# Patient Record
Sex: FEMALE | Race: WHITE | NOT HISPANIC OR LATINO | Employment: FULL TIME | URBAN - NONMETROPOLITAN AREA
[De-identification: names, ages, dates, MRNs, and addresses within clinical notes are randomized per-mention and may not be internally consistent; named-entity substitution may affect disease eponyms.]

---

## 2020-08-05 ENCOUNTER — LAB (OUTPATIENT)
Dept: LAB | Facility: HOSPITAL | Age: 30
End: 2020-08-05

## 2020-08-05 ENCOUNTER — OFFICE VISIT (OUTPATIENT)
Dept: FAMILY MEDICINE CLINIC | Facility: CLINIC | Age: 30
End: 2020-08-05

## 2020-08-05 VITALS
HEIGHT: 61 IN | DIASTOLIC BLOOD PRESSURE: 82 MMHG | TEMPERATURE: 98.8 F | BODY MASS INDEX: 27.38 KG/M2 | SYSTOLIC BLOOD PRESSURE: 124 MMHG | WEIGHT: 145 LBS

## 2020-08-05 DIAGNOSIS — R41.840 POOR CONCENTRATION: ICD-10-CM

## 2020-08-05 DIAGNOSIS — R53.83 MALAISE AND FATIGUE: ICD-10-CM

## 2020-08-05 DIAGNOSIS — Z00.00 GENERAL MEDICAL EXAM: Primary | ICD-10-CM

## 2020-08-05 DIAGNOSIS — R53.81 MALAISE AND FATIGUE: ICD-10-CM

## 2020-08-05 LAB
25(OH)D3 SERPL-MCNC: 30.6 NG/ML (ref 30–100)
ALBUMIN SERPL-MCNC: 4.3 G/DL (ref 3.5–5.2)
ALBUMIN/GLOB SERPL: 1.5 G/DL
ALP SERPL-CCNC: 60 U/L (ref 39–117)
ALT SERPL W P-5'-P-CCNC: 17 U/L (ref 1–33)
ANION GAP SERPL CALCULATED.3IONS-SCNC: 8.1 MMOL/L (ref 5–15)
AST SERPL-CCNC: 10 U/L (ref 1–32)
BASOPHILS # BLD AUTO: 0.04 10*3/MM3 (ref 0–0.2)
BASOPHILS NFR BLD AUTO: 0.5 % (ref 0–1.5)
BILIRUB SERPL-MCNC: 0.4 MG/DL (ref 0–1.2)
BUN SERPL-MCNC: 10 MG/DL (ref 6–20)
BUN/CREAT SERPL: 12 (ref 7–25)
CALCIUM SPEC-SCNC: 9.3 MG/DL (ref 8.6–10.5)
CHLORIDE SERPL-SCNC: 105 MMOL/L (ref 98–107)
CHOLEST SERPL-MCNC: 181 MG/DL (ref 0–200)
CO2 SERPL-SCNC: 22.9 MMOL/L (ref 22–29)
CREAT SERPL-MCNC: 0.83 MG/DL (ref 0.57–1)
DEPRECATED RDW RBC AUTO: 40.6 FL (ref 37–54)
EOSINOPHIL # BLD AUTO: 0.03 10*3/MM3 (ref 0–0.4)
EOSINOPHIL NFR BLD AUTO: 0.4 % (ref 0.3–6.2)
ERYTHROCYTE [DISTWIDTH] IN BLOOD BY AUTOMATED COUNT: 13 % (ref 12.3–15.4)
GFR SERPL CREATININE-BSD FRML MDRD: 80 ML/MIN/1.73
GLOBULIN UR ELPH-MCNC: 2.9 GM/DL
GLUCOSE SERPL-MCNC: 86 MG/DL (ref 65–99)
HCT VFR BLD AUTO: 41 % (ref 34–46.6)
HDLC SERPL-MCNC: 47 MG/DL (ref 40–60)
HGB BLD-MCNC: 14.1 G/DL (ref 12–15.9)
IMM GRANULOCYTES # BLD AUTO: 0.03 10*3/MM3 (ref 0–0.05)
IMM GRANULOCYTES NFR BLD AUTO: 0.4 % (ref 0–0.5)
IRON 24H UR-MRATE: 88 MCG/DL (ref 37–145)
LDLC SERPL CALC-MCNC: 122 MG/DL (ref 0–100)
LDLC/HDLC SERPL: 2.59 {RATIO}
LYMPHOCYTES # BLD AUTO: 2.79 10*3/MM3 (ref 0.7–3.1)
LYMPHOCYTES NFR BLD AUTO: 34 % (ref 19.6–45.3)
MAGNESIUM SERPL-MCNC: 2.2 MG/DL (ref 1.6–2.6)
MCH RBC QN AUTO: 29.9 PG (ref 26.6–33)
MCHC RBC AUTO-ENTMCNC: 34.4 G/DL (ref 31.5–35.7)
MCV RBC AUTO: 86.9 FL (ref 79–97)
MONOCYTES # BLD AUTO: 0.42 10*3/MM3 (ref 0.1–0.9)
MONOCYTES NFR BLD AUTO: 5.1 % (ref 5–12)
NEUTROPHILS NFR BLD AUTO: 4.9 10*3/MM3 (ref 1.7–7)
NEUTROPHILS NFR BLD AUTO: 59.6 % (ref 42.7–76)
NRBC BLD AUTO-RTO: 0 /100 WBC (ref 0–0.2)
PLATELET # BLD AUTO: 141 10*3/MM3 (ref 140–450)
PMV BLD AUTO: 12 FL (ref 6–12)
POTASSIUM SERPL-SCNC: 4.4 MMOL/L (ref 3.5–5.2)
PROT SERPL-MCNC: 7.2 G/DL (ref 6–8.5)
RBC # BLD AUTO: 4.72 10*6/MM3 (ref 3.77–5.28)
SODIUM SERPL-SCNC: 136 MMOL/L (ref 136–145)
T3 SERPL-MCNC: 120 NG/DL (ref 80–200)
T4 FREE SERPL-MCNC: 1.08 NG/DL (ref 0.93–1.7)
TRIGL SERPL-MCNC: 62 MG/DL (ref 0–150)
TSH SERPL DL<=0.05 MIU/L-ACNC: 0.72 UIU/ML (ref 0.27–4.2)
VIT B12 BLD-MCNC: 671 PG/ML (ref 211–946)
VLDLC SERPL-MCNC: 12.4 MG/DL (ref 5–40)
WBC # BLD AUTO: 8.21 10*3/MM3 (ref 3.4–10.8)

## 2020-08-05 PROCEDURE — 80061 LIPID PANEL: CPT | Performed by: NURSE PRACTITIONER

## 2020-08-05 PROCEDURE — 84443 ASSAY THYROID STIM HORMONE: CPT | Performed by: NURSE PRACTITIONER

## 2020-08-05 PROCEDURE — 36415 COLL VENOUS BLD VENIPUNCTURE: CPT | Performed by: NURSE PRACTITIONER

## 2020-08-05 PROCEDURE — 85025 COMPLETE CBC W/AUTO DIFF WBC: CPT | Performed by: NURSE PRACTITIONER

## 2020-08-05 PROCEDURE — 82306 VITAMIN D 25 HYDROXY: CPT | Performed by: NURSE PRACTITIONER

## 2020-08-05 PROCEDURE — 80053 COMPREHEN METABOLIC PANEL: CPT | Performed by: NURSE PRACTITIONER

## 2020-08-05 PROCEDURE — 83735 ASSAY OF MAGNESIUM: CPT | Performed by: NURSE PRACTITIONER

## 2020-08-05 PROCEDURE — 83540 ASSAY OF IRON: CPT | Performed by: NURSE PRACTITIONER

## 2020-08-05 PROCEDURE — 99203 OFFICE O/P NEW LOW 30 MIN: CPT | Performed by: NURSE PRACTITIONER

## 2020-08-05 PROCEDURE — 84480 ASSAY TRIIODOTHYRONINE (T3): CPT | Performed by: NURSE PRACTITIONER

## 2020-08-05 PROCEDURE — 84439 ASSAY OF FREE THYROXINE: CPT | Performed by: NURSE PRACTITIONER

## 2020-08-05 PROCEDURE — 82607 VITAMIN B-12: CPT | Performed by: NURSE PRACTITIONER

## 2020-08-05 RX ORDER — CETIRIZINE HYDROCHLORIDE 10 MG/1
10 TABLET ORAL DAILY
COMMUNITY

## 2020-08-05 RX ORDER — BUPROPION HYDROCHLORIDE 300 MG/1
300 TABLET ORAL DAILY
COMMUNITY
Start: 2019-11-13

## 2020-08-05 NOTE — PROGRESS NOTES
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     Presents with concern for concentration and malaise-history of same but getting worse-takes wellbutrin for anxiety and it helps -general exam and to est care-requesting labs     Fatigue   This is a recurrent problem. The current episode started more than 1 month ago. The problem occurs intermittently. The problem has been gradually worsening. Associated symptoms include fatigue. Pertinent negatives include no abdominal pain, anorexia, arthralgias, change in bowel habit, chest pain, chills, congestion, coughing, diaphoresis, fever, headaches, joint swelling, myalgias, nausea, neck pain, numbness, rash, sore throat, swollen glands, urinary symptoms, vertigo, visual change, vomiting or weakness. She has tried rest for the symptoms. The treatment provided mild relief.        The following portions of the patient's history were reviewed and updated as appropriate: allergies, current medications, past social history and problem list.    Review of Systems   Constitutional: Positive for fatigue. Negative for activity change, appetite change, chills, diaphoresis, fever and unexpected weight change.   HENT: Negative.  Negative for congestion, dental problem, drooling, ear discharge, ear pain, facial swelling, hearing loss, mouth sores, nosebleeds, postnasal drip, rhinorrhea, sinus pressure, sinus pain, sneezing and sore throat.    Eyes: Negative.  Negative for photophobia, pain, discharge, redness, itching and visual disturbance.   Respiratory: Negative.  Negative for apnea, cough, choking, chest tightness, shortness of breath, wheezing and stridor.    Cardiovascular: Negative.  Negative for chest pain, palpitations and leg swelling.   Gastrointestinal: Negative.  Negative for abdominal distention, abdominal pain, anal bleeding, anorexia, blood in stool, change in bowel habit,  "constipation, diarrhea, nausea, rectal pain and vomiting.   Endocrine: Negative.  Negative for cold intolerance, heat intolerance, polydipsia, polyphagia and polyuria.   Genitourinary: Negative.    Musculoskeletal: Negative.  Negative for arthralgias, back pain, gait problem, joint swelling, myalgias, neck pain and neck stiffness.   Skin: Negative.  Negative for color change, pallor and rash.   Allergic/Immunologic: Negative.  Negative for environmental allergies, food allergies and immunocompromised state.   Neurological: Negative.  Negative for dizziness, vertigo, tremors, seizures, syncope, facial asymmetry, speech difficulty, weakness, light-headedness, numbness and headaches.   Hematological: Negative.  Negative for adenopathy. Does not bruise/bleed easily.   Psychiatric/Behavioral: Positive for decreased concentration and sleep disturbance. Negative for agitation, behavioral problems, confusion, dysphoric mood, hallucinations, self-injury and suicidal ideas. The patient is nervous/anxious. The patient is not hyperactive.         Patient has taken lexapro, wellbutrin       Concentration concerns        Objective   /82   Temp 98.8 °F (37.1 °C) (Tympanic)   Ht 154.9 cm (61\")   Wt 65.8 kg (145 lb)   BMI 27.40 kg/m²   Physical Exam   Constitutional: She is oriented to person, place, and time. She appears well-developed and well-nourished.   HENT:   Head: Normocephalic and atraumatic.   Right Ear: External ear normal.   Left Ear: External ear normal.   Mouth/Throat: Oropharynx is clear and moist. No oropharyngeal exudate.   Eyes: Pupils are equal, round, and reactive to light. EOM are normal. Right eye exhibits no discharge. Left eye exhibits no discharge. No scleral icterus.   Neck: Normal range of motion. Neck supple. No JVD present. No tracheal deviation present. No thyromegaly present.   Cardiovascular: Normal rate, regular rhythm and normal heart sounds. Exam reveals no gallop and no friction rub. "   No murmur heard.  Pulmonary/Chest: Effort normal and breath sounds normal. No stridor. No respiratory distress. She has no wheezes. She has no rales. She exhibits no tenderness.   Abdominal: Soft. Bowel sounds are normal. She exhibits no distension and no mass. There is no tenderness. There is no rebound and no guarding. No hernia.   Musculoskeletal: Normal range of motion. She exhibits no edema, tenderness or deformity.   Lymphadenopathy:     She has no cervical adenopathy.   Neurological: She is alert and oriented to person, place, and time. She displays normal reflexes. No cranial nerve deficit or sensory deficit. She exhibits normal muscle tone. Coordination normal.   Skin: No rash noted. No erythema. No pallor.   Nursing note and vitals reviewed.      Assessment/Plan   Problem List Items Addressed This Visit        Other    General medical exam - Primary    Relevant Orders    CBC & Differential    Comprehensive Metabolic Panel    TSH    Vitamin B12    Vitamin D 25 Hydroxy    Magnesium    T3    T4, Free    Iron    Ambulatory Referral to Behavioral Health (Completed)    Lipid Panel    CBC Auto Differential    Malaise and fatigue    Relevant Orders    CBC & Differential    Comprehensive Metabolic Panel    TSH    Vitamin B12    Vitamin D 25 Hydroxy    Magnesium    T3    T4, Free    Iron    Ambulatory Referral to Behavioral Health (Completed)    Lipid Panel    CBC Auto Differential    Poor concentration    Relevant Orders    Ambulatory Referral to Behavioral Health (Completed)         No orders of the defined types were placed in this encounter.      It's not just what you eat, but when you eat  Eat breakfast, and eat smaller meals throughout the day. A healthy breakfast can jumpstart your metabolism, while eating small, healthy meals (rather than the standard three large meals) keeps your energy up.   Avoid eating at night. Try to eat dinner earlier and fast for 14-16 hours until breakfast the next morning.  Studies suggest that eating only when you’re most active and giving your digestive system a long break each day may help to regulate weight.     Continue wellbutrin refer for mental health evaluation for concentration concerns patient agrees

## 2020-10-14 ENCOUNTER — APPOINTMENT (OUTPATIENT)
Dept: GENERAL RADIOLOGY | Facility: HOSPITAL | Age: 30
End: 2020-10-14

## 2020-10-14 ENCOUNTER — HOSPITAL ENCOUNTER (EMERGENCY)
Facility: HOSPITAL | Age: 30
Discharge: HOME | End: 2020-10-14
Attending: EMERGENCY MEDICINE | Admitting: EMERGENCY MEDICINE

## 2020-10-14 VITALS
HEIGHT: 61 IN | HEART RATE: 101 BPM | SYSTOLIC BLOOD PRESSURE: 138 MMHG | DIASTOLIC BLOOD PRESSURE: 81 MMHG | BODY MASS INDEX: 27.66 KG/M2 | RESPIRATION RATE: 20 BRPM | WEIGHT: 146.5 LBS | TEMPERATURE: 97.9 F | OXYGEN SATURATION: 97 %

## 2020-10-14 DIAGNOSIS — S93.601A SPRAIN OF RIGHT FOOT, INITIAL ENCOUNTER: Primary | ICD-10-CM

## 2020-10-14 PROCEDURE — 73630 X-RAY EXAM OF FOOT: CPT

## 2020-10-14 PROCEDURE — 99283 EMERGENCY DEPT VISIT LOW MDM: CPT

## 2020-10-14 RX ORDER — NAPROXEN 500 MG/1
500 TABLET ORAL 2 TIMES DAILY PRN
Qty: 10 TABLET | Refills: 0 | Status: SHIPPED | OUTPATIENT
Start: 2020-10-14 | End: 2020-10-19 | Stop reason: SDUPTHER

## 2020-10-14 RX ORDER — NAPROXEN 500 MG/1
500 TABLET ORAL ONCE
Status: COMPLETED | OUTPATIENT
Start: 2020-10-14 | End: 2020-10-14

## 2020-10-14 RX ADMIN — NAPROXEN 500 MG: 500 TABLET ORAL at 23:24

## 2020-10-15 NOTE — ED NOTES
Walking boot applied to right foot, pt educated on care of boot; pt is educated in crutch walking and returns safe demonstration.      Erasmo Prakash, RN  10/14/20 [id number]

## 2020-10-15 NOTE — ED PROVIDER NOTES
Subjective   30-year-old white female presents to the emergency department with chief complaint of foot injury.  Patient relates she was running in the gym and fell injuring her right foot at 4:45 PM.  She relates the pain is 9 out of 10 severity.  She relates the pain is worse with movement and walking.  She relates she is feeling well otherwise.  She relates she is not pregnant.          Review of Systems   Constitutional: Negative for fever.   Respiratory: Negative for cough and shortness of breath.    Cardiovascular: Negative for chest pain.   Gastrointestinal: Negative for abdominal pain, nausea and vomiting.   Genitourinary: Negative for dysuria.   Musculoskeletal: Positive for arthralgias. Negative for back pain and neck pain.   Neurological: Negative for syncope, weakness, numbness and headaches.   All other systems reviewed and are negative.      Past Medical History:   Diagnosis Date   • Encounter for  visit    • Pneumonia, histoplasma (CMS/HCC)     on sporonox      • Solitary lung nodule     RIGHT lower lobe      • Supervision of normal first pregnancy        Allergies   Allergen Reactions   • Ceclor [Cefaclor] Rash       Past Surgical History:   Procedure Laterality Date   • LAPAROSCOPIC APPENDECTOMY  2012    Acute appendicitis   • PAP SMEAR  2010    negative   • WISDOM TOOTH EXTRACTION         Family History   Problem Relation Age of Onset   • No Known Problems Mother    • No Known Problems Father    • Colon cancer Maternal Aunt    • Breast cancer Paternal Aunt    • Breast cancer Other    • Colon cancer Other    • Diabetes Other    • Heart disease Other    • Hypertension Other    • Stroke Other    • Gallbladder disease Other         stones       Social History     Socioeconomic History   • Marital status:      Spouse name: Not on file   • Number of children: Not on file   • Years of education: Not on file   • Highest education level: Not on file   Tobacco Use   • Smoking  status: Never Smoker   • Tobacco comment: No exposure to environmental tobacco smoke   Substance and Sexual Activity   • Alcohol use: No           Objective   Physical Exam  Vitals signs and nursing note reviewed.   Constitutional:       General: She is not in acute distress.     Appearance: She is not diaphoretic.   HENT:      Head: Normocephalic and atraumatic.      Right Ear: External ear normal.      Left Ear: External ear normal.      Nose: Nose normal.      Mouth/Throat:      Mouth: Mucous membranes are moist.      Pharynx: Oropharynx is clear.   Eyes:      Extraocular Movements: Extraocular movements intact.      Conjunctiva/sclera: Conjunctivae normal.      Pupils: Pupils are equal, round, and reactive to light.   Neck:      Musculoskeletal: Normal range of motion and neck supple.   Cardiovascular:      Rate and Rhythm: Normal rate and regular rhythm.      Pulses: Normal pulses.      Heart sounds: Normal heart sounds.   Pulmonary:      Effort: Pulmonary effort is normal.      Breath sounds: Normal breath sounds.   Abdominal:      Palpations: Abdomen is soft.      Tenderness: There is no abdominal tenderness.   Musculoskeletal:      Comments: Right foot tenderness on the dorsal and plantar aspect.  No deformity.  Neurovascular intact distally.   Skin:     General: Skin is warm and dry.   Neurological:      Mental Status: She is alert.      Sensory: No sensory deficit.   Psychiatric:         Mood and Affect: Mood normal.         Behavior: Behavior normal.         Procedures           ED Course      Labs Reviewed - No data to display  Xr Foot 3+ View Right    Result Date: 10/14/2020  Narrative: EXAM DESCRIPTION: XR FOOT 3+ VW RIGHT CLINICAL HISTORY: injury COMPARISON: None TECHNIQUE: Three views of the right foot. FINDINGS: There is no acute fracture or dislocation. Bone mineralization is within normal limits. Joint spaces are preserved. Soft tissues are unremarkable. There is no radiopaque foreign body  material.     Impression: No acute osseous abnormality Electronically signed by:  Daniel Payton MD  10/14/2020 9:31 PM CDT Workstation: [id number]                                       [facility]    Final diagnoses:   Sprain of right foot, initial encounter            Morales Sawant MD  10/14/20 [id number]

## 2020-10-19 ENCOUNTER — OFFICE VISIT (OUTPATIENT)
Dept: PODIATRY | Facility: CLINIC | Age: 30
End: 2020-10-19

## 2020-10-19 VITALS — BODY MASS INDEX: 27.66 KG/M2 | OXYGEN SATURATION: 99 % | WEIGHT: 146.5 LBS | HEIGHT: 61 IN | HEART RATE: 127 BPM

## 2020-10-19 DIAGNOSIS — M79.671 RIGHT FOOT PAIN: ICD-10-CM

## 2020-10-19 DIAGNOSIS — S93.621A LISFRANC'S SPRAIN, RIGHT, INITIAL ENCOUNTER: Primary | ICD-10-CM

## 2020-10-19 PROCEDURE — 99203 OFFICE O/P NEW LOW 30 MIN: CPT | Performed by: PODIATRIST

## 2020-10-19 RX ORDER — NAPROXEN 500 MG/1
500 TABLET ORAL 2 TIMES DAILY PRN
Qty: 60 TABLET | Refills: 0 | Status: SHIPPED | OUTPATIENT
Start: 2020-10-19 | End: 2020-10-19 | Stop reason: SDUPTHER

## 2020-10-19 RX ORDER — FLUOXETINE HYDROCHLORIDE 20 MG/1
40 CAPSULE ORAL DAILY
COMMUNITY
Start: 2020-10-16

## 2020-10-19 RX ORDER — NAPROXEN 500 MG/1
500 TABLET ORAL 2 TIMES DAILY PRN
Qty: 60 TABLET | Refills: 0 | Status: SHIPPED | OUTPATIENT
Start: 2020-10-19 | End: 2020-11-16

## 2020-10-19 NOTE — PROGRESS NOTES
Rosey Aguilar  1990  30 y.o. female     Patient presents to clinic today with complaint of right foot pain. Patient is here for a follow up after going to the ER with foot pain.    10/19/2020  Chief Complaint   Patient presents with   • Right Foot - Pain           History of Present Illness    Rosey Aguilar is a 30 y.o. female who presents on follow-up from the emergency department for evaluation of right foot injury.  Injury occurred approximately 1 week prior.  Patient states she was working out with a friend and racing when she jumped and kicked off a wall.  She states when she landed she landed with her foot fully plantarflexed and landed on the ball of her foot.  She did have immediate sharp pain that was not too severe however she then experienced increased swelling and tightness in her shoe.  By later that evening she states she was unable to ambulate.  She denies any other trauma or injuries.  She was seen in the emergency department who took x-rays which were negative for fracture.  She was placed into a cam boot and states that her foot does feel better while in the cam boot, however she still having pain on top and bottom of her midfoot.  She has also taken naproxen which helps somewhat with her pain.      Past Medical History:   Diagnosis Date   • Encounter for  visit    • Pneumonia, histoplasma (CMS/HCC)     on sporonox      • Solitary lung nodule     RIGHT lower lobe      • Supervision of normal first pregnancy          Past Surgical History:   Procedure Laterality Date   •  SECTION     • LAPAROSCOPIC APPENDECTOMY  2012    Acute appendicitis   • PAP SMEAR  2010    negative   • WISDOM TOOTH EXTRACTION           Family History   Problem Relation Age of Onset   • No Known Problems Mother    • No Known Problems Father    • Colon cancer Maternal Aunt    • Breast cancer Paternal Aunt    • Breast cancer Other    • Colon cancer Other    • Diabetes Other    • Heart  "disease Other    • Hypertension Other    • Stroke Other    • Gallbladder disease Other         stones         Social History     Socioeconomic History   • Marital status:      Spouse name: Not on file   • Number of children: Not on file   • Years of education: Not on file   • Highest education level: Not on file   Tobacco Use   • Smoking status: Never Smoker   • Tobacco comment: No exposure to environmental tobacco smoke   Substance and Sexual Activity   • Alcohol use: No         Current Outpatient Medications   Medication Sig Dispense Refill   • buPROPion XL (WELLBUTRIN XL) 300 MG 24 hr tablet Take 300 mg by mouth Daily.     • cetirizine (ZyrTEC Allergy) 10 MG tablet      • FLUoxetine (PROzac) 20 MG capsule      • naproxen (NAPROSYN) 500 MG tablet Take 1 tablet by mouth 2 (Two) Times a Day As Needed for Moderate Pain . 60 tablet 0     No current facility-administered medications for this visit.          OBJECTIVE    Pulse (!) 127   Ht 154.9 cm (61\")   Wt 66.5 kg (146 lb 8 oz)   SpO2 99%   BMI 27.68 kg/m²       Review of Systems   Constitutional: Positive for activity change.   Musculoskeletal:        Foot pain   Psychiatric/Behavioral: The patient is nervous/anxious.          Physical Exam   Constitutional: she appears well-developed and well-nourished.   HEENT: Normocephalic. Atraumatic.  CV: No CP. RRR  Resp: Non-labored respirations.  Psychiatric: she has a normal mood and affect. her behavior is normal.         Lower Extremity Exam:  Vascular: DP/PT pulses palpable 2+.   Mild edema, right foot  Foot warm  CFT wnl  Neuro: Protective sensation intact, b/l.  DTRs intact  Negative Tinel  Integument: No open wounds or lesions.  No erythema, scaling  No masses  Musculoskeletal: LE muscle strength 5/5.   Gait normal  Ankle ROM decreased without pain or crepitus  STJ ROM full without pain or crepitus  Severe tenderness palpation of first intermetatarsal space, tarsometatarsal joint.  No obvious crepitus on " range of motion          ASSESSMENT AND PLAN    Diagnoses and all orders for this visit:    1. Lisfranc's sprain, right, initial encounter (Primary)    2. Right foot pain      -Comprehensive foot and ankle exam  -Radiographs reviewed  -History of injury and physical exam concerning for ligamentous Lisfranc injury.  Did recommend MRI to rule out ligamentous rupture.  -Continue cam boot for all weightbearing  -Refill naproxen 500 mg twice daily  -Recheck following MRI          This document has been electronically signed by Fabrice Langley DPM on October 19, 2020 14:45 CDT     EMR Dragon/Transcription disclaimer:   Much of this encounter note is an electronic transcription/translation of spoken language to printed text. The electronic translation of spoken language may permit erroneous, or at times, nonsensical words or phrases to be inadvertently transcribed; Although I have reviewed the note for such errors, some may still exist.    Fabrice Langley DPM  10/19/2020  14:45 CDT

## 2020-10-27 ENCOUNTER — HOSPITAL ENCOUNTER (OUTPATIENT)
Dept: MRI IMAGING | Facility: HOSPITAL | Age: 30
Discharge: HOME | End: 2020-10-27
Admitting: PODIATRIST

## 2020-10-27 DIAGNOSIS — S93.621A LISFRANC'S SPRAIN, RIGHT, INITIAL ENCOUNTER: ICD-10-CM

## 2020-10-27 PROCEDURE — 73718 MRI LOWER EXTREMITY W/O DYE: CPT

## 2020-11-02 ENCOUNTER — OFFICE VISIT (OUTPATIENT)
Dept: PODIATRY | Facility: CLINIC | Age: 30
End: 2020-11-02

## 2020-11-02 VITALS — BODY MASS INDEX: 26.81 KG/M2 | HEART RATE: 120 BPM | HEIGHT: 61 IN | OXYGEN SATURATION: 98 % | WEIGHT: 142 LBS

## 2020-11-02 DIAGNOSIS — S92.254D CLOSED NONDISPLACED FRACTURE OF NAVICULAR BONE OF RIGHT FOOT WITH ROUTINE HEALING, SUBSEQUENT ENCOUNTER: Primary | ICD-10-CM

## 2020-11-02 DIAGNOSIS — S93.621D LISFRANC'S SPRAIN, RIGHT, SUBSEQUENT ENCOUNTER: ICD-10-CM

## 2020-11-02 DIAGNOSIS — M79.671 RIGHT FOOT PAIN: ICD-10-CM

## 2020-11-02 PROCEDURE — 99213 OFFICE O/P EST LOW 20 MIN: CPT | Performed by: PODIATRIST

## 2020-11-02 NOTE — PROGRESS NOTES
Rosey Aguilar  1990  30 y.o. female     Patient presents to clinic today with for a follow up on right foot pain and MRI results.    2020    Chief Complaint   Patient presents with   • Right Foot - Follow-up, Pain           History of Present Illness    Rosey Aguilar is a 30 y.o. female who presents on follow-up of right midfoot injury.  Injury occurred while working out approximately 3 weeks prior.  Had MRI since last visit as there was some concern for ligamentous Lisfranc injury.  She presents today in cam boot and modified weightbearing with crutches.  Still having some pain, mildly improved.    Past Medical History:   Diagnosis Date   • Encounter for  visit    • Pneumonia, histoplasma (CMS/HCC)     on sporonox      • Solitary lung nodule     RIGHT lower lobe      • Supervision of normal first pregnancy          Past Surgical History:   Procedure Laterality Date   •  SECTION     • LAPAROSCOPIC APPENDECTOMY  2012    Acute appendicitis   • PAP SMEAR  2010    negative   • WISDOM TOOTH EXTRACTION           Family History   Problem Relation Age of Onset   • No Known Problems Mother    • No Known Problems Father    • Colon cancer Maternal Aunt    • Breast cancer Paternal Aunt    • Breast cancer Other    • Colon cancer Other    • Diabetes Other    • Heart disease Other    • Hypertension Other    • Stroke Other    • Gallbladder disease Other         stones         Social History     Socioeconomic History   • Marital status:      Spouse name: Not on file   • Number of children: Not on file   • Years of education: Not on file   • Highest education level: Not on file   Tobacco Use   • Smoking status: Never Smoker   • Tobacco comment: No exposure to environmental tobacco smoke   Substance and Sexual Activity   • Alcohol use: No         Current Outpatient Medications   Medication Sig Dispense Refill   • buPROPion XL (WELLBUTRIN XL) 300 MG 24 hr tablet Take 300 mg by mouth  "Daily.     • cetirizine (ZyrTEC Allergy) 10 MG tablet      • FLUoxetine (PROzac) 20 MG capsule      • naproxen (NAPROSYN) 500 MG tablet Take 1 tablet by mouth 2 (Two) Times a Day As Needed for Moderate Pain . 60 tablet 0     No current facility-administered medications for this visit.          OBJECTIVE    Pulse 120   Ht 154.9 cm (61\")   Wt 64.4 kg (142 lb)   SpO2 98%   BMI 26.83 kg/m²       Review of Systems   Constitutional: Positive for activity change.   Musculoskeletal:        Foot pain   Psychiatric/Behavioral: The patient is nervous/anxious.          Physical Exam   Constitutional: she appears well-developed and well-nourished.   HEENT: Normocephalic. Atraumatic.  CV: No CP. RRR  Resp: Non-labored respirations.  Psychiatric: she has a normal mood and affect. her behavior is normal.         Lower Extremity Exam:  Vascular: DP/PT pulses palpable 2+.   Minimal edema, right foot  Foot warm  CFT wnl  Neuro: Protective sensation intact, b/l.  DTRs intact  Negative Tinel  Integument: No open wounds or lesions.  No erythema, scaling  No masses  Musculoskeletal: LE muscle strength 5/5.   Gait normal  Ankle ROM decreased without pain or crepitus  STJ ROM full without pain or crepitus  Moderate tenderness palpation of dorsal midfoot tarsometatarsal and naviculocuneiform joints.  No gross instability or crepitus.    EXAM: MR FOOT WITHOUT IV CONTRAST, RIGHT     COMPARISONS: Radiograph 10/14/2020     INDICATION: Foot trauma, Lisfranc suspected, xray done (Age >=  6y), S93.621A Sprain of tarsometatarsal ligament of right foot,  initial encounter     TECHNIQUE: Multiplanar, multisequence MR images were obtained of  the right  foot without the use of intravenous contrast.     FINDINGS:  Edema is seen throughout the navicular bone with thin curvilinear  hypointense nondisplaced fracture line at the more distal and  plantar aspect.     No dislocation. Joint spaces are preserved.      There is no widening of the Lisfranc " interval. Lisfranc ligaments  appear intact. No significant edema is seen within the Lisfranc  interval.     Small focus of faint subchondral cystic changes at the medial and  distal aspect of the medial cuneiform.     Talar dome is intact. Syndesmosis is of normal width.     Medial and lateral ankle tendons are grossly intact.     Flexor and extensor tendons of the right foot are grossly  unremarkable.     Medial and lateral ankle ligaments are grossly intact.     Sinus tarsi is within normal limits. Spring ligament is intact.     Plantar fascia is unremarkable. Achilles tendon is normal.     Musculature is grossly normal in bulk and signal.     Superficial subcutaneous soft tissues are unremarkable.     IMPRESSION:  Nondisplaced fracture of the right navicular bone.     No Lisfranc abnormality as clinically queried.     Minimal degenerative changes at the first tarsometatarsal joint.     Electronically signed by:  Sandy Mack MD  10/27/2020  2:16 PM CDT Workstation: [id number]      ASSESSMENT AND PLAN    Diagnoses and all orders for this visit:    1. Closed nondisplaced fracture of navicular bone of right foot with routine healing, subsequent encounter (Primary)    2. Lisfranc's sprain, right, subsequent encounter    3. Right foot pain      -Comprehensive foot and ankle exam  -MRI reviewed with patient.  No obvious ligamentous tear at the Lisfranc apparatus however patient does have significant bone bruising and probable small plantar avulsion fracture of the navicular.  -We will transition her into a tall boot continue modified weightbearing with crutches, may heel weight-bear as tolerated around her house.  -NSAIDs as needed  -Recheck 3 weeks          This document has been electronically signed by Fabrice Langley DPM on November 3, 2020 07:48 CST     EMR Dragon/Transcription disclaimer:   Much of this encounter note is an electronic transcription/translation of spoken language to printed text. The  electronic translation of spoken language may permit erroneous, or at times, nonsensical words or phrases to be inadvertently transcribed; Although I have reviewed the note for such errors, some may still exist.    Fabrice Langley DPM  11/3/2020  07:48 CST

## 2020-11-16 RX ORDER — NAPROXEN 500 MG/1
500 TABLET ORAL 2 TIMES DAILY PRN
Qty: 60 TABLET | Refills: 0 | Status: SHIPPED | OUTPATIENT
Start: 2020-11-16

## 2020-11-23 ENCOUNTER — OFFICE VISIT (OUTPATIENT)
Dept: PODIATRY | Facility: CLINIC | Age: 30
End: 2020-11-23

## 2020-11-23 VITALS — WEIGHT: 142 LBS | BODY MASS INDEX: 26.81 KG/M2 | OXYGEN SATURATION: 98 % | HEIGHT: 61 IN | HEART RATE: 115 BPM

## 2020-11-23 DIAGNOSIS — S93.621D LISFRANC'S SPRAIN, RIGHT, SUBSEQUENT ENCOUNTER: ICD-10-CM

## 2020-11-23 DIAGNOSIS — S92.254D CLOSED NONDISPLACED FRACTURE OF NAVICULAR BONE OF RIGHT FOOT WITH ROUTINE HEALING, SUBSEQUENT ENCOUNTER: Primary | ICD-10-CM

## 2020-11-23 DIAGNOSIS — M79.671 RIGHT FOOT PAIN: ICD-10-CM

## 2020-11-23 PROCEDURE — 99213 OFFICE O/P EST LOW 20 MIN: CPT | Performed by: PODIATRIST

## 2020-11-23 NOTE — PROGRESS NOTES
Rosey Aguilar  1990  30 y.o. female     Patient presents to clinic today with for a follow up on right foot pain.    2020    Chief Complaint   Patient presents with   • Right Foot - Follow-up           History of Present Illness    Rosey Aguilar is a 30 y.o. female who presents on follow-up of right nondisplaced navicular fracture, Lisfranc sprain.  She has been partial protected weightbearing in a tall cam boot with assistance of crutches since last visit.  Her pain is overall improving however she does note that she tried to ambulate outside of the cam boot for just a couple of steps and noticed a significant increase in pain to the midfoot.    Past Medical History:   Diagnosis Date   • Encounter for  visit    • Pneumonia, histoplasma (CMS/HCC)     on sporonox      • Solitary lung nodule     RIGHT lower lobe      • Supervision of normal first pregnancy          Past Surgical History:   Procedure Laterality Date   •  SECTION     • LAPAROSCOPIC APPENDECTOMY  2012    Acute appendicitis   • PAP SMEAR  2010    negative   • WISDOM TOOTH EXTRACTION           Family History   Problem Relation Age of Onset   • No Known Problems Mother    • No Known Problems Father    • Colon cancer Maternal Aunt    • Breast cancer Paternal Aunt    • Breast cancer Other    • Colon cancer Other    • Diabetes Other    • Heart disease Other    • Hypertension Other    • Stroke Other    • Gallbladder disease Other         stones         Social History     Socioeconomic History   • Marital status:      Spouse name: Not on file   • Number of children: Not on file   • Years of education: Not on file   • Highest education level: Not on file   Tobacco Use   • Smoking status: Never Smoker   • Tobacco comment: No exposure to environmental tobacco smoke   Substance and Sexual Activity   • Alcohol use: No         Current Outpatient Medications   Medication Sig Dispense Refill   • buPROPion XL  "(WELLBUTRIN XL) 300 MG 24 hr tablet Take 300 mg by mouth Daily.     • cetirizine (ZyrTEC Allergy) 10 MG tablet      • FLUoxetine (PROzac) 20 MG capsule      • naproxen (NAPROSYN) 500 MG tablet TAKE 1 TABLET BY MOUTH 2 (TWO) TIMES A DAY AS NEEDED FOR MODERATE PAIN . 60 tablet 0     No current facility-administered medications for this visit.          OBJECTIVE    Pulse 115   Ht 154.9 cm (61\")   Wt 64.4 kg (142 lb)   SpO2 98%   BMI 26.83 kg/m²       Review of Systems   Constitutional: Positive for activity change.   Musculoskeletal:        Foot pain   Psychiatric/Behavioral: The patient is nervous/anxious.          Physical Exam   Constitutional: she appears well-developed and well-nourished.   HEENT: Normocephalic. Atraumatic.  CV: No CP. RRR  Resp: Non-labored respirations.  Psychiatric: she has a normal mood and affect. her behavior is normal.         Lower Extremity Exam:  Vascular: DP/PT pulses palpable 2+.   No edema  Foot warm  CFT wnl  Neuro: Protective sensation intact, b/l.  DTRs intact  Negative Tinel  Integument: No open wounds or lesions.  No erythema, scaling  No masses  Musculoskeletal: LE muscle strength 5/5.   Gait normal  Ankle ROM decreased without pain or crepitus  STJ ROM full without pain or crepitus  Moderate tenderness palpation of dorsal midfoot tarsometatarsal and naviculocuneiform joints.  No gross instability or crepitus.        ASSESSMENT AND PLAN    Diagnoses and all orders for this visit:    1. Closed nondisplaced fracture of navicular bone of right foot with routine healing, subsequent encounter (Primary)  -     XR Foot 3+ View Right    2. Right foot pain    3. Lisfranc's sprain, right, subsequent encounter      -Comprehensive foot and ankle exam  -Patient symptoms slowly improving, majority of her tenderness remains over tarsometatarsal joints.  -Patient at about 5-1/2 weeks post injury currently.  I did advise continued immobilization in cam boot and partial protected " weightbearing for 3 additional weeks.  We will reevaluate at that time and possibly transition to regular shoes.  -Recheck 3 weeks          This document has been electronically signed by Fabrice Langley DPM on November 23, 2020 16:28 CST     EMR Dragon/Transcription disclaimer:   Much of this encounter note is an electronic transcription/translation of spoken language to printed text. The electronic translation of spoken language may permit erroneous, or at times, nonsensical words or phrases to be inadvertently transcribed; Although I have reviewed the note for such errors, some may still exist.    Fabrice Langley DPM  11/23/2020  16:28 CST

## 2021-01-13 ENCOUNTER — HOSPITAL ENCOUNTER (OUTPATIENT)
Dept: PHYSICAL THERAPY | Facility: HOSPITAL | Age: 31
Setting detail: THERAPIES SERIES
Discharge: HOME | End: 2021-01-13

## 2021-01-13 ENCOUNTER — TRANSCRIBE ORDERS (OUTPATIENT)
Dept: PHYSICAL THERAPY | Facility: HOSPITAL | Age: 31
End: 2021-01-13

## 2021-01-13 DIAGNOSIS — M79.671 RIGHT FOOT PAIN: Primary | ICD-10-CM

## 2021-01-13 DIAGNOSIS — S92.254D CLOSED NONDISPLACED FRACTURE OF NAVICULAR BONE OF RIGHT FOOT WITH ROUTINE HEALING, SUBSEQUENT ENCOUNTER: ICD-10-CM

## 2021-01-13 PROCEDURE — 97161 PT EVAL LOW COMPLEX 20 MIN: CPT | Performed by: PHYSICAL THERAPIST

## 2021-01-13 NOTE — THERAPY EVALUATION
Outpatient Physical Therapy Ortho Initial Evaluation  [facility]     Patient Name: Rosey Aguilar  : 1990  MRN: [id number]  Today's Date: 2021      Visit Date: 2021  Attendance:  (authorization required)  Subjective Improvement: n/a  Next MD Appt: 2021  Recert Date: 2/10/2021    Therapy Diagnosis: 1) R foot pain; 2) R navicular fracture         Past Medical History:   Diagnosis Date   • Anxiety    • Encounter for  visit    • Pneumonia, histoplasma (CMS/HCC)     on sporonox      • Solitary lung nodule     RIGHT lower lobe      • Supervision of normal first pregnancy         Past Surgical History:   Procedure Laterality Date   •  SECTION     • LAPAROSCOPIC APPENDECTOMY  2012    Acute appendicitis   • PAP SMEAR  2010    negative   • WISDOM TOOTH EXTRACTION       Allergies   Allergen Reactions   • Ceclor [Cefaclor] Rash       Visit Dx:     ICD-10-CM ICD-9-CM   1. Right foot pain  M79.671 729.5   2. Closed nondisplaced fracture of navicular bone of right foot with routine healing, subsequent encounter  S92.254D V54.19         Patient History     Row Name 21 0800             History    Chief Complaint  Difficulty Walking;Difficulty with daily activities;Pain  -SS      Type of Pain  Foot pain right  -SS      Date Current Problem(s) Began  10/14/20  -SS      Brief Description of Current Complaint  Patient was injured on 10/14/2020 while working out. States that she kicked off from the wall and landed on the ball of her foot with ankle plantarflexed. Was able to complete her workout, but foot became progressively more swollen and painful. She was seen at ED. Initial x-rays negative. Seen by Podiatry at [facility]. MRI showed navicular fracture. She has been in a walking boot 10/14/2020. She has subsequently started seeing an orthopedic surgeon at [facility] in [place]. She is noting pain in the dorsal foot. MRI on 2020 shows bone  "edema of the navicular particularly the inferior and medial aspects. Not wearing walking boot at home. Right foot doesn't hurt just walking around. Dorsal foot pain increases \"as time goes on.\" Plantar surface of the foot tingles when she's out of the boot for a while.  female with children. Lives in a single story house with a basement. There 1-2 steps to enter and 12-15 steps to basement.   -SS      Patient/Caregiver Goals  Return to prior level of function  -SS      Current Tobacco Use  no  -SS      Smoking Status  never  -SS      Patient's Rating of General Health  Very good  -SS      Occupation/sports/leisure activities  [employer] - , no lost work time. Hobbies: working out, generally being active  -SS      What clinical tests have you had for this problem?  X-ray;MRI  -SS      Results of Clinical Tests  bony edema of R navicular but fractures are healing  -SS         Pain     Pain Location  Foot right  -SS      Pain at Present  2  -SS      Pain at Best  0 over past 30 days  -SS      Pain at Worst  8 over past 30 days  -SS      Pain Frequency  Intermittent  -SS      Pain Description  Burning  -SS      What Performance Factors Make the Current Problem(s) WORSE?  pressure on foot, being up on feet for a while  -SS      What Performance Factors Make the Current Problem(s) BETTER?  getting off of feet  -SS      Is your sleep disturbed?  No  -SS      Is medication used to assist with sleep?  No  -SS      Difficulties at work?  none  -SS      Difficulties with ADL's?  slightly limited  -SS      Difficulties with recreational activities?  limited  -SS         Fall Risk Assessment    Any falls in the past year:  No  -SS      Does patient have a fear of falling  No  -SS         Daily Activities    Primary Language  English  -         Safety    Are you being hurt, hit, or frightened by anyone at home or in your life?  No  -SS      Are you being neglected by a caregiver  No  -SS      Have you " had any of the following issues with  Anxiety  -        User Key  (r) = Recorded By, (t) = Taken By, (c) = Cosigned By    Initials Name Provider Type    Derrick Davis PT DPT Physical Therapist          PT Ortho     Row Name 01/13/21 0900       Subjective Comments    Subjective Comments  see Therapy Patient History  -SS       Precautions and Contraindications    Precautions  healing R navicular fracture  -SS       Subjective Pain    Able to rate subjective pain?  yes  -SS    Pre-Treatment Pain Level  2  -SS    Post-Treatment Pain Level  2  -SS       Posture/Observations    Posture/Observations Comments  Presents this date wearing walking boot on R foot/ankle. Mild antalgia with gait.  -SS       Ankle/Foot Special Tests    Ankle Special Tests Comments  TTP R 1st metatarsal and arch. Non-tender to palpation otherwise. No hypersensitivity noted with touch of right foot.  -SS       Right Lower Ext    Rt Ankle Dorsiflexion AROM  5 deg; mild pain  -SS    Rt Ankle Plantarflexion AROM  45 deg; mild pain  -SS    Rt Ankle Inversion AROM  38 deg; pain  -SS    Rt Ankle Eversion AROM  3 deg  -SS       Balance Skills Training    SLS  Eyes open: increased ankle strategy on right compared to left. Eyes Closed: R shoulder/trunk strategy and decreased duration of stance, L ankle strategy. Discomfort with SLS on R.   -SS      User Key  (r) = Recorded By, (t) = Taken By, (c) = Cosigned By    Initials Name Provider Type    Derrick Davis, JERMAINE JAIMEST Physical Therapist          Therapy Education  Education Details: calf stretch, plantar fascia stretch, ankle ABCs  Given: HEP  Program: New  How Provided: Verbal, Demonstration  Provided to: Patient  Level of Understanding: Verbalized, Demonstrated     PT OP Goals     Row Name 01/13/21 0800          PT Short Term Goals    STG Date to Achieve  -- STGs deferred  -SS        Long Term Goals    LTG Date to Achieve  02/10/21  -     LTG 1  Independent with self-management.   -     LTG 2  R ankle AROM WNLs without pain.  -     LTG 3  Non-antalgic gait in regular shoes.  -     LTG 4  Resume light exercise in regular shoes.  -     LTG 5  LEFS score to be >/= 50/80.  -        Time Calculation    PT Goal Re-Cert Due Date  02/03/21  -       User Key  (r) = Recorded By, (t) = Taken By, (c) = Cosigned By    Initials Name Provider Type     Derrick Lang, PT DPT Physical Therapist          PT Assessment/Plan     Row Name 01/13/21 0800          PT Assessment    Functional Limitations  Impaired gait;Limitation in home management;Limitations in community activities;Limitations in functional capacity and performance;Performance in leisure activities;Performance in self-care ADL;Performance in sport activities;Performance in work activities  -     Impairments  Balance;Gait;Pain;Range of motion  -     Assessment Comments  Patient is 3 months s/p R navicular fracture. Limitation in ankle motion, limitation in activity, and pain persists.   -     Rehab Potential  Good  -     Patient/caregiver participated in establishment of treatment plan and goals  Yes  -     Patient would benefit from skilled therapy intervention  Yes  -SS        PT Plan    PT Frequency  2x/week  -     Predicted Duration of Therapy Intervention (PT)  4 weeks  -     Planned CPT's?  PT EVAL LOW COMPLEXITY: 63256;PT THER PROC EA 15 MIN: 84695;PT THER ACT EA 15 MIN: 48482;PT MANUAL THERAPY EA 15 MIN: 47138;PT GAIT TRAINING EA 15 MIN: 29784;PT ULTRASOUND EA 15 MIN: 47185;PT THER SUPP EA 15 MIN  -     PT Plan Comments  Ankle and foot ROM and stretching, LE and foot/ankle strengthening, balance training, proprioception training, gait training, US to plantar foot, ice  -       User Key  (r) = Recorded By, (t) = Taken By, (c) = Cosigned By    Initials Name Provider Type    Derrick Davis, PT DPT Physical Therapist            Outcome Measure Options: Lower Extremity Functional Scale  (LEFS)  Lower Extremity Functional Index  Any of your usual work, housework or school activities: A little bit of difficulty  Your usual hobbies, recreational or sporting activities: Quite a bit of difficulty  Getting into or out of the bath: No difficulty  Walking between rooms: No difficulty  Putting on your shoes or socks: No difficulty  Squatting: Quite a bit of difficulty  Lifting an object, like a bag of groceries from the floor: No difficulty  Performing light activities around your home: A little bit of difficulty  Performing heavy activities around your home: Quite a bit of difficulty  Getting into or out of a car: No difficulty  Walking 2 blocks: Quite a bit of difficulty  Walking a mile: Extreme difficulty or unable to perform activity  Going up or down 10 stairs (about 1 flight of stairs): Moderate difficulty  Standing for 1 hour: Quite a bit of difficulty  Sitting for 1 hour: No difficulty  Running on even ground: Extreme difficulty or unable to perform activity  Running on uneven ground: Extreme difficulty or unable to perform activity  Making sharp turns while running fast: Extreme difficulty or unable to perform activity  Hopping: Extreme difficulty or unable to perform activity  Rolling over in bed: No difficulty  Total: 41      Time Calculation:     Start Time: 0851  Stop Time: 0930  Time Calculation (min): 39 min     Therapy Charges for Today     Code Description Service Date Service Provider Modifiers Qty    96616718406 HC PT EVAL LOW COMPLEXITY 3 1/13/2021 Derrick Lang, PT DPT GP 1                   Derrick Lang PT, DPT, CHT  1/13/2021

## 2021-01-20 ENCOUNTER — HOSPITAL ENCOUNTER (OUTPATIENT)
Dept: PHYSICAL THERAPY | Facility: HOSPITAL | Age: 31
Setting detail: THERAPIES SERIES
Discharge: HOME | End: 2021-01-20

## 2021-01-20 DIAGNOSIS — S92.254D CLOSED NONDISPLACED FRACTURE OF NAVICULAR BONE OF RIGHT FOOT WITH ROUTINE HEALING, SUBSEQUENT ENCOUNTER: ICD-10-CM

## 2021-01-20 DIAGNOSIS — M79.671 RIGHT FOOT PAIN: Primary | ICD-10-CM

## 2021-01-20 PROCEDURE — 97110 THERAPEUTIC EXERCISES: CPT

## 2021-01-20 PROCEDURE — 97035 APP MDLTY 1+ULTRASOUND EA 15: CPT

## 2021-01-20 NOTE — THERAPY TREATMENT NOTE
Outpatient Physical Therapy Ortho Treatment Note  [facility]     Patient Name: Rosey Aguilar  : 1990  MRN: [id number]  Today's Date: 2021      Visit Date: 2021     Subjective Improvement not assessed this date  Visits 2/2  Visits approved 6 from 1- to 3-  RTMD 2021  Recert Date 2-    R foot pain and R navicular Fracture    Visit Dx:    ICD-10-CM ICD-9-CM   1. Right foot pain  M79.671 729.5   2. Closed nondisplaced fracture of navicular bone of right foot with routine healing, subsequent encounter  S92.254D V54.19       Patient Active Problem List   Diagnosis   • General medical exam   • Malaise and fatigue   • Poor concentration        Past Medical History:   Diagnosis Date   • Anxiety    • Encounter for  visit    • Pneumonia, histoplasma (CMS/HCC)     on sporonox      • Solitary lung nodule     RIGHT lower lobe      • Supervision of normal first pregnancy         Past Surgical History:   Procedure Laterality Date   •  SECTION     • LAPAROSCOPIC APPENDECTOMY  2012    Acute appendicitis   • PAP SMEAR  2010    negative   • WISDOM TOOTH EXTRACTION         PT Ortho     Row Name 21 0900       Subjective Comments    Subjective Comments  Patient reports no pain and states that her foot does not feel unstable.  she is hiping to get out of the walking boot on her next MD appointment  -CP       Precautions and Contraindications    Precautions  healing R navicular fracture  -CP       Subjective Pain    Able to rate subjective pain?  yes  -CP    Pre-Treatment Pain Level  0  -CP    Post-Treatment Pain Level  0  -CP       Posture/Observations    Posture/Observations Comments  Presents this date wearing walking boot on R foot/ankle. Mild antalgia with gait.  -CP      User Key  (r) = Recorded By, (t) = Taken By, (c) = Cosigned By    Initials Name Provider Type    CP Emily Nielsen, PTA Physical Therapy Assistant                      PT  Assessment/Plan     Row Name 01/20/21 1055          PT Assessment    Assessment Comments  No pain with AROM but did have some slight pain with toe yoga.  She is TTP to plantar foot.  -CP        PT Plan    Predicted Duration of Therapy Intervention (PT)  4 weeks  -CP     PT Plan Comments  Cont with POC  incline stretch and side stepping  -CP       User Key  (r) = Recorded By, (t) = Taken By, (c) = Cosigned By    Initials Name Provider Type    CP Emily Nielsen PTA Physical Therapy Assistant          Modalities     Row Name 01/20/21 0900             Ice    Ice Applied  Yes  -CP      Location  R foot  -CP      Rx Minutes  15 mins  -CP      Ice S/P Rx  Yes  -CP         Ultrasound 41330    Location  plantar foot  -CP      Duty Cycle  100  -CP      Frequency  3.0 MHz  -CP      Intensity - Wts/cm  1.5  -CP      46036 - PT Ultrasound Minutes  8  -CP        User Key  (r) = Recorded By, (t) = Taken By, (c) = Cosigned By    Initials Name Provider Type    CP Emily Nielsen PTA Physical Therapy Assistant        OP Exercises     Row Name 01/20/21 0900             Subjective Comments    Subjective Comments  Patient reports no pain and states that her foot does not feel unstable.  she is hiping to get out of the walking boot on her next MD appointment  -CP         Subjective Pain    Able to rate subjective pain?  yes  -CP      Pre-Treatment Pain Level  0  -CP      Post-Treatment Pain Level  0  -CP         Exercise 1    Exercise Name 1  Pro II level 2  -CP      Time 1  10  -CP         Exercise 2    Exercise Name 2  rocker board DF/PF  -CP      Cueing 2  Verbal;Tactile  -CP      Reps 2  30  -CP         Exercise 3    Exercise Name 3  rocker board EV/INV  -CP      Cueing 3  Tactile;Verbal  -CP      Reps 3  30  -CP         Exercise 4    Exercise Name 4  DF stretch with strap  -CP      Cueing 4  Verbal;Tactile  -CP      Sets 4  3  -CP      Time 4  30  -CP         Exercise 5    Exercise Name 5  Toe yoga  -CP      Cueing 5   Verbal;Tactile  -CP      Sets 5  2  -CP      Reps 5  15  -CP         Exercise 6    Exercise Name 6  ankle tband 4 way  -CP      Cueing 6  Verbal;Tactile  -CP      Sets 6  2  -CP      Reps 6  10  -CP      Time 6  red tband  -CP         Exercise 7    Exercise Name 7  see modalities  -CP        User Key  (r) = Recorded By, (t) = Taken By, (c) = Cosigned By    Initials Name Provider Type    Emily Delatorre PTA Physical Therapy Assistant                       PT OP Goals     Row Name 01/20/21 1000          PT Short Term Goals    STG Date to Achieve  -- STGs deferred  -CP        Long Term Goals    LTG Date to Achieve  02/10/21  -CP     LTG 1  Independent with self-management.  -CP     LTG 1 Progress  Ongoing  -CP     LTG 2  R ankle AROM WNLs without pain.  -CP     LTG 2 Progress  Progressing  -CP     LTG 3  Non-antalgic gait in regular shoes.  -CP     LTG 3 Progress  Not Met  -CP     LTG 4  Resume light exercise in regular shoes.  -CP     LTG 4 Progress  Not Met  -CP     LTG 5  LEFS score to be >/= 50/80.  -CP     LTG 5 Progress  Not Met  -CP        Time Calculation    PT Goal Re-Cert Due Date  02/03/21  -CP       User Key  (r) = Recorded By, (t) = Taken By, (c) = Cosigned By    Initials Name Provider Type    CP Emily Nielsen PTA Physical Therapy Assistant          Therapy Education  Education Details: ankle 4 way with tband, toe yoga  Given: HEP  Program: New  How Provided: Verbal, Demonstration, Written  Provided to: Patient  Level of Understanding: Teach back education performed, Verbalized, Demonstrated              Time Calculation:   Start Time: 1020  Stop Time: 1130  Time Calculation (min): 70 min  Total Timed Code Minutes- PT: 53 minute(s)  Therapy Charges for Today     Code Description Service Date Service Provider Modifiers Qty    82051604153 HC PT THER SUPP EA 15 MIN 1/20/2021 Emily Nielsen, CARLA GP 1    37465304879 HC PT ULTRASOUND EA 15 MIN 1/20/2021 Emily Nielsen PTA GP 1    41320284707 HC  PT THER PROC EA 15 MIN 1/20/2021 Emily Nielsen, PTA GP 3                    Emily Nielsen, PTA  1/20/2021

## 2021-01-27 ENCOUNTER — HOSPITAL ENCOUNTER (OUTPATIENT)
Dept: PHYSICAL THERAPY | Facility: HOSPITAL | Age: 31
Setting detail: THERAPIES SERIES
Discharge: HOME | End: 2021-01-27

## 2021-01-27 DIAGNOSIS — S92.254D CLOSED NONDISPLACED FRACTURE OF NAVICULAR BONE OF RIGHT FOOT WITH ROUTINE HEALING, SUBSEQUENT ENCOUNTER: ICD-10-CM

## 2021-01-27 DIAGNOSIS — M79.671 RIGHT FOOT PAIN: Primary | ICD-10-CM

## 2021-01-27 PROCEDURE — 97110 THERAPEUTIC EXERCISES: CPT

## 2021-01-27 NOTE — THERAPY TREATMENT NOTE
Outpatient Physical Therapy Ortho Treatment Note  [facility]     Patient Name: Rosey Aguilar  : 1990  MRN: [id number]  Today's Date: 2021      Visit Date: 2021     Subjective Improvement a lot better/  Visits 3/3  Visits approved 6 from 1- to 3-  RTMD 2021  Recert Date 2-    R Foot pain and R navicular Fracture    Visit Dx:    ICD-10-CM ICD-9-CM   1. Right foot pain  M79.671 729.5   2. Closed nondisplaced fracture of navicular bone of right foot with routine healing, subsequent encounter  S92.254D V54.19       Patient Active Problem List   Diagnosis   • General medical exam   • Malaise and fatigue   • Poor concentration        Past Medical History:   Diagnosis Date   • Anxiety    • Encounter for  visit    • Pneumonia, histoplasma (CMS/HCC)     on sporonox      • Solitary lung nodule     RIGHT lower lobe      • Supervision of normal first pregnancy         Past Surgical History:   Procedure Laterality Date   •  SECTION     • LAPAROSCOPIC APPENDECTOMY  2012    Acute appendicitis   • PAP SMEAR  2010    negative   • WISDOM TOOTH EXTRACTION         PT Ortho     Row Name 21 1100       Subjective Comments    Subjective Comments  Patient states that MD told her things looked good.  and to d/C walking boot.  -CP       Subjective Pain    Able to rate subjective pain?  yes  -CP    Pre-Treatment Pain Level  0  -CP    Post-Treatment Pain Level  0  -CP    Subjective Pain Comment  just sore at times  -CP       Posture/Observations    Posture/Observations Comments  no walking boot  -CP      User Key  (r) = Recorded By, (t) = Taken By, (c) = Cosigned By    Initials Name Provider Type    Emily Delatorre, PTA Physical Therapy Assistant                      PT Assessment/Plan     Row Name 21 1113          PT Assessment    Assessment Comments  patient is progresing well.  She shoulde rehab fairly quickly.  Patient reported no increase  pain this date.  -CP        PT Plan    PT Frequency  2x/week  -CP     Predicted Duration of Therapy Intervention (PT)  4 weeks  -CP     PT Plan Comments  Cont with POC.  up and down ramp.  SL stance  -CP       User Key  (r) = Recorded By, (t) = Taken By, (c) = Cosigned By    Initials Name Provider Type    CP Emily Nielsen PTA Physical Therapy Assistant          Modalities     Row Name 01/27/21 1100             Ice    Ice Applied  Yes  -CP      Location  right foot  -CP      Rx Minutes  15 mins  -CP      Ice S/P Rx  Yes  -CP         Ultrasound 58338    Location  plantar foot  -CP      Duty Cycle  100  -CP      Frequency  3.0 MHz  -CP      Intensity - Wts/cm  1.5  -CP      28867 - PT Ultrasound Minutes  8  -CP        User Key  (r) = Recorded By, (t) = Taken By, (c) = Cosigned By    Initials Name Provider Type    CP Emily Nielsen PTA Physical Therapy Assistant        OP Exercises     Row Name 01/27/21 1100             Subjective Comments    Subjective Comments  Patient states that MD told her things looked good.  and to d/C walking boot.  -CP         Subjective Pain    Able to rate subjective pain?  yes  -CP      Pre-Treatment Pain Level  0  -CP      Post-Treatment Pain Level  0  -CP      Subjective Pain Comment  just sore at times  -CP         Exercise 1    Exercise Name 1  pro II level 3  -CP      Time 1  10  -CP         Exercise 2    Exercise Name 2  incline stretch  -CP      Cueing 2  Verbal  -CP      Sets 2  3  -CP      Time 2  30  -CP         Exercise 3    Exercise Name 3  solous stretch  -CP      Cueing 3  Verbal  -CP      Sets 3  3  -CP      Time 3  30  -CP         Exercise 4    Exercise Name 4  alt CR/TR  -CP      Cueing 4  Verbal;Demo  -CP      Reps 4  20  -CP      Time 4  bilateral  -CP         Exercise 5    Exercise Name 5  side stepping on foam  -CP      Cueing 5  Verbal;Demo  -CP      Reps 5  5  -CP         Exercise 6    Exercise Name 6  heel toe gait on foam  -CP      Cueing 6  Verbal;Demo  -CP  "     Reps 6  5  -CP         Exercise 7    Exercise Name 7  R LE lunge for right DF stretch  -CP      Cueing 7  Verbal;Demo  -CP      Sets 7  1  -CP      Reps 7  10  -CP         Exercise 8    Exercise Name 8  Step up 4\"  -CP      Cueing 8  Verbal;Demo  -CP      Sets 8  2  -CP      Reps 8  10  -CP         Exercise 9    Exercise Name 9  see modalities  -CP        User Key  (r) = Recorded By, (t) = Taken By, (c) = Cosigned By    Initials Name Provider Type    CP Emily Nielsen PTA Physical Therapy Assistant                       PT OP Goals     Row Name 01/27/21 1100          PT Short Term Goals    STG Date to Achieve  -- STGs deferred  -CP        Long Term Goals    LTG Date to Achieve  02/10/21  -CP     LTG 1  Independent with self-management.  -CP     LTG 1 Progress  Ongoing  -CP     LTG 2  R ankle AROM WNLs without pain.  -CP     LTG 2 Progress  Progressing  -CP     LTG 3  Non-antalgic gait in regular shoes.  -CP     LTG 3 Progress  Not Met  -CP     LTG 4  Resume light exercise in regular shoes.  -CP     LTG 4 Progress  Not Met  -CP     LTG 5  LEFS score to be >/= 50/80.  -CP     LTG 5 Progress  Not Met  -CP        Time Calculation    PT Goal Re-Cert Due Date  02/03/21  -CP       User Key  (r) = Recorded By, (t) = Taken By, (c) = Cosigned By    Initials Name Provider Type    CP Emily Nielsen PTA Physical Therapy Assistant          Therapy Education  Education Details: side stepping at counter, alt CR/TR,  Given: HEP  Program: New  How Provided: Verbal, Demonstration  Provided to: Patient  Level of Understanding: Teach back education performed, Verbalized, Demonstrated              Time Calculation:   Start Time: 0935  Stop Time: 1035  Time Calculation (min): 60 min  Total Timed Code Minutes- PT: 45 minute(s)  Therapy Charges for Today     Code Description Service Date Service Provider Modifiers Qty    83150582797 HC PT THER PROC EA 15 MIN 1/27/2021 Emily Nielsen, CARLA GP 3                    Emily HEAD " [place]ales, PTA  1/27/2021

## 2021-01-29 ENCOUNTER — APPOINTMENT (OUTPATIENT)
Dept: PHYSICAL THERAPY | Facility: HOSPITAL | Age: 31
End: 2021-01-29

## 2021-02-01 ENCOUNTER — HOSPITAL ENCOUNTER (OUTPATIENT)
Dept: PHYSICAL THERAPY | Facility: HOSPITAL | Age: 31
Setting detail: THERAPIES SERIES
Discharge: HOME | End: 2021-02-01

## 2021-02-01 DIAGNOSIS — S92.254D CLOSED NONDISPLACED FRACTURE OF NAVICULAR BONE OF RIGHT FOOT WITH ROUTINE HEALING, SUBSEQUENT ENCOUNTER: ICD-10-CM

## 2021-02-01 DIAGNOSIS — M79.671 RIGHT FOOT PAIN: Primary | ICD-10-CM

## 2021-02-01 PROCEDURE — 97110 THERAPEUTIC EXERCISES: CPT

## 2021-02-01 NOTE — THERAPY TREATMENT NOTE
Outpatient Physical Therapy Ortho Treatment Note  [facility]     Patient Name: Rosey Aguilar  : 1990  MRN: [id number]  Today's Date: 2021      Visit Date: 2021    Subjective Improvement 80%  Visits 4/5  Visits approved 6 from 1- to 3-  RTMD 2021  Recert Date 2-    R Foot pain and R navicular fracture    Visit Dx:    ICD-10-CM ICD-9-CM   1. Right foot pain  M79.671 729.5   2. Closed nondisplaced fracture of navicular bone of right foot with routine healing, subsequent encounter  S92.254D V54.19       Patient Active Problem List   Diagnosis   • General medical exam   • Malaise and fatigue   • Poor concentration        Past Medical History:   Diagnosis Date   • Anxiety    • Encounter for  visit    • Pneumonia, histoplasma (CMS/HCC)     on sporonox      • Solitary lung nodule     RIGHT lower lobe      • Supervision of normal first pregnancy         Past Surgical History:   Procedure Laterality Date   •  SECTION     • LAPAROSCOPIC APPENDECTOMY  2012    Acute appendicitis   • PAP SMEAR  2010    negative   • WISDOM TOOTH EXTRACTION         PT Ortho     Row Name 21 0900       Subjective Comments    Subjective Comments  Patient presents with no pain today.  she does have some pain if she over works it  -CP       Precautions and Contraindications    Precautions  healing R navicular fracture  -CP       Subjective Pain    Able to rate subjective pain?  yes  -CP    Pre-Treatment Pain Level  0  -CP       Posture/Observations    Posture/Observations Comments  non antalgic gait in tennis shoe  -CP      User Key  (r) = Recorded By, (t) = Taken By, (c) = Cosigned By    Initials Name Provider Type    Emily Delatorre, PTA Physical Therapy Assistant                      PT Assessment/Plan     Row Name 21 1105          PT Assessment    Assessment Comments  Reports of slight pain/pressure with ramp walks.  Patient does amb with a  "non-antalgic gait with tennis shoes.  she is progressing very well and would appear to ready for D/C after total of 6 visits  -CP        PT Plan    PT Frequency  2x/week  -CP     Predicted Duration of Therapy Intervention (PT)  4 weeks  -CP     PT Plan Comments  Cont with POC.  SL balance activites. ramp up and down and resisted walks on CC  -CP       User Key  (r) = Recorded By, (t) = Taken By, (c) = Cosigned By    Initials Name Provider Type    Emily Delatorre PTA Physical Therapy Assistant          Modalities     Row Name 02/01/21 0900             Subjective Pain    Post-Treatment Pain Level  0  -CP         Ice    Ice Applied  Yes  -CP      Location  right foot  -CP      Rx Minutes  15 mins  -CP      Ice S/P Rx  Yes  -CP        User Key  (r) = Recorded By, (t) = Taken By, (c) = Cosigned By    Initials Name Provider Type    Emily Delatorre PTA Physical Therapy Assistant        OP Exercises     Row Name 02/01/21 0900             Subjective Comments    Subjective Comments  Patient presents with no pain today.  she does have some pain if she over works it  -CP         Subjective Pain    Able to rate subjective pain?  yes  -CP      Pre-Treatment Pain Level  0  -CP      Post-Treatment Pain Level  0  -CP         Exercise 1    Exercise Name 1  Pro II level 4  -CP      Time 1  10  -CP         Exercise 2    Exercise Name 2  incline stretch  -CP      Cueing 2  Verbal  -CP      Sets 2  3  -CP      Time 2  30  -CP         Exercise 3    Exercise Name 3  solous stretch  -CP      Cueing 3  Verbal  -CP      Sets 3  3  -CP      Time 3  30  -CP         Exercise 4    Exercise Name 4  step up 6\"  -CP      Cueing 4  Verbal  -CP      Sets 4  2  -CP      Reps 4  10  -CP         Exercise 5    Exercise Name 5  lat step up 6\"  -CP      Cueing 5  Verbal  -CP      Sets 5  2  -CP      Reps 5  10  -CP         Exercise 6    Exercise Name 6  up and down ramp  -CP      Cueing 6  Verbal;Demo  -CP      Reps 6  5 each  -CP      Time 6  " forward and backward  -CP         Exercise 7    Exercise Name 7  up and down ramp  -CP      Cueing 7  Verbal  -CP      Reps 7  3  -CP      Time 7  bilateral lead  -CP         Exercise 8    Exercise Name 8  SL rebounder  -CP      Cueing 8  Verbal;Demo  -CP      Sets 8  2  -CP      Reps 8  20  -CP      Time 8  4 lb ball  -CP         Exercise 9    Exercise Name 9  553  -CP      Cueing 9  Verbal;Demo  -CP      Sets 9  1  -CP      Reps 9  10  -CP         Exercise 10    Exercise Name 10  mini squats  -CP      Cueing 10  Verbal;Demo  -CP      Sets 10  2  -CP      Reps 10  10  -CP        User Key  (r) = Recorded By, (t) = Taken By, (c) = Cosigned By    Initials Name Provider Type    Emily Delatorre, PTA Physical Therapy Assistant                       PT OP Goals     Row Name 02/01/21 1000          PT Short Term Goals    STG Date to Achieve  -- STGs deferred  -CP        Long Term Goals    LTG Date to Achieve  02/10/21  -CP     LTG 1  Independent with self-management.  -CP     LTG 1 Progress  Ongoing  -CP     LTG 2  R ankle AROM WNLs without pain.  -CP     LTG 2 Progress  Progressing  -CP     LTG 3  Non-antalgic gait in regular shoes.  -CP     LTG 3 Progress  Met  -CP     LTG 4  Resume light exercise in regular shoes.  -CP     LTG 4 Progress  Met  -CP     LTG 5  LEFS score to be >/= 50/80.  -CP     LTG 5 Progress  Not Met  -CP        Time Calculation    PT Goal Re-Cert Due Date  02/03/21  -CP       User Key  (r) = Recorded By, (t) = Taken By, (c) = Cosigned By    Initials Name Provider Type    Emily Delatorre PTA Physical Therapy Assistant          Therapy Education  Education Details: 553  Given: HEP  Program: New  How Provided: Verbal, Demonstration  Provided to: Patient  Level of Understanding: Teach back education performed, Verbalized, Demonstrated              Time Calculation:   Start Time: 0935  Stop Time: 1030  Time Calculation (min): 55 min  Total Timed Code Minutes- PT: 40 minute(s)  Therapy Charges for  Today     Code Description Service Date Service Provider Modifiers Qty    47956942066 HC PT THER SUPP EA 15 MIN 2/1/2021 Emily Nielsen, CARLA GP 1    62949989967 HC PT THER PROC EA 15 MIN 2/1/2021 Emily Nielsen PTA GP 3                    Emily Nielsen PTA  2/1/2021

## 2021-02-04 ENCOUNTER — HOSPITAL ENCOUNTER (OUTPATIENT)
Dept: PHYSICAL THERAPY | Facility: HOSPITAL | Age: 31
Setting detail: THERAPIES SERIES
Discharge: HOME | End: 2021-02-04

## 2021-02-04 DIAGNOSIS — M79.671 RIGHT FOOT PAIN: Primary | ICD-10-CM

## 2021-02-04 DIAGNOSIS — S92.254D CLOSED NONDISPLACED FRACTURE OF NAVICULAR BONE OF RIGHT FOOT WITH ROUTINE HEALING, SUBSEQUENT ENCOUNTER: ICD-10-CM

## 2021-02-04 PROCEDURE — 97110 THERAPEUTIC EXERCISES: CPT

## 2021-02-04 PROCEDURE — 97530 THERAPEUTIC ACTIVITIES: CPT

## 2021-02-04 PROCEDURE — 97010 HOT OR COLD PACKS THERAPY: CPT

## 2021-02-04 NOTE — THERAPY DISCHARGE NOTE
Outpatient Physical Therapy Ortho Progress Note/Discharge Summary  [facility]     Patient Name: Rosey Aguilar  : 1990  MRN: [id number]  Today's Date: 2021      Visit Date: 2021   Attendance: 5/6 (6 approved)  Subjective Improvement: 90%  Next MD Visit: 2021  Recert Date: n/a    Therapy Diagnosis: R Foot pain and R navicular fracture      Visit Dx:    ICD-10-CM ICD-9-CM   1. Right foot pain  M79.671 729.5   2. Closed nondisplaced fracture of navicular bone of right foot with routine healing, subsequent encounter  S92.254D V54.19       Patient Active Problem List   Diagnosis   • General medical exam   • Malaise and fatigue   • Poor concentration        Past Medical History:   Diagnosis Date   • Anxiety    • Encounter for  visit    • Pneumonia, histoplasma (CMS/HCC)     on sporonox      • Solitary lung nodule     RIGHT lower lobe      • Supervision of normal first pregnancy         Past Surgical History:   Procedure Laterality Date   •  SECTION     • LAPAROSCOPIC APPENDECTOMY  2012    Acute appendicitis   • PAP SMEAR  2010    negative   • WISDOM TOOTH EXTRACTION         PT Ortho     Row Name 21 0900       Subjective Comments    Subjective Comments  Pt stated that physical therapy has been going very well. She stated that her foot has a tendency to get sore with prolonged standing/walking. She also stated that her and her MD believe that she may have some nerve damage as her toes go numb and feel 10 deg colder at all times. 90% subjective improvement.   -       Precautions and Contraindications    Precautions  healing R navicular fracture  -       Subjective Pain    Able to rate subjective pain?  yes  -    Pre-Treatment Pain Level  0  -       Posture/Observations    Posture/Observations Comments  No TTP, no hypersensitivity. Toes are cooler in temp compared to rest of foot. Able to wiggle, flex, ext, and flair all toes.  -       Right Lower  Ext    Rt Ankle Dorsiflexion AROM  10 deg; no pain  -MH    Rt Ankle Plantarflexion AROM  55 deg; no pain  -MH    Rt Ankle Inversion AROM  38 deg; no pain  -MH    Rt Ankle Eversion AROM  25 deg; no pain  -MH       MMT (Manual Muscle Testing)    Rt Lower Ext  Rt Ankle Plantarflexion;Rt Ankle Dorsiflexion;Rt Ankle Subtalar Inversion;Rt Ankle Subtalar Eversion  -       MMT Right Lower Ext    Rt Ankle Plantarflexion MMT, Gross Movement  (5/5) normal  -MH    Rt Ankle Dorsiflexion MMT, Gross Movement  (5/5) normal  -MH    Rt Ankle Subtalar Inversion MT, Gross Movement  (4/5) good  -MH    Rt Ankle Subtalar Eversion MMT, Gross Movement  (4/5) good  -MH    Rt Lower Extremity Comments   Pain free  -       Balance Skills Training    SLS  Eyes open: 30 sec, mild increase in ankle stratify. Eyes closed: 8 sec with increased ankle, hip, trunk strategies.  -       Gait/Stairs (Locomotion)    [place] Level (Gait)  independent  -    Comment (Gait/Stairs)  No gait deviation.  -      User Key  (r) = Recorded By, (t) = Taken By, (c) = Cosigned By    Initials Name Provider Type     Madan Lebron, PT Physical Therapist                      PT Assessment/Plan     Row Name 02/04/21 0900          PT Assessment    Functional Limitations  Impaired gait;Limitation in home management;Limitations in community activities;Limitations in functional capacity and performance;Performance in leisure activities;Performance in self-care ADL;Performance in sport activities;Performance in work activities  -     Impairments  Balance;Gait;Pain;Range of motion  -     Assessment Comments  Recheck completed this visit. Pt has demonstrated improvements in ROM, strength, balance, and gait. She has full ankle ROM without pain and 5/5 MMT with DF and PF. 4/5 with IV and EV. Pt was provided with a green and blue tband to advance her HEP. HEP was updated and reviewed. Pt has met all goals. At this time she is d/c from skilled physical therapy.   -     Rehab Potential  Good  -     Patient/caregiver participated in establishment of treatment plan and goals  Yes  -     Patient would benefit from skilled therapy intervention  Yes  -        PT Plan    PT Frequency  --  -MH     Predicted Duration of Therapy Intervention (PT)  d/c  -     PT Plan Comments  d/c home to [facility]  -       User Key  (r) = Recorded By, (t) = Taken By, (c) = Cosigned By    Initials Name Provider Type     Madan Lebron, JERMAINE Physical Therapist          Modalities     Row Name 02/04/21 0900             Ice    Ice Applied  Yes  -      Location  right foot  -      Rx Minutes  15 mins  -      Ice S/P Rx  Yes  -        User Key  (r) = Recorded By, (t) = Taken By, (c) = Cosigned By    Initials Name Provider Type     Madan Lebron, PT Physical Therapist          OP Exercises     Row Name 02/04/21 0900             Subjective Comments    Subjective Comments  Pt stated that physical therapy has been going very well. She stated that her foot has a tendency to get sore with prolonged standing/walking. She also stated that her and her MD believe that she may have some nerve damage as her toes go numb and feel 10 deg colder at all times. 90% subjective improvement.   -         Subjective Pain    Able to rate subjective pain?  yes  -      Pre-Treatment Pain Level  0  -      Post-Treatment Pain Level  0  -         Exercise 1    Exercise Name 1  Pro II LE; strength  -      Time 1  10 min  -      Additional Comments  lvl 4; seat 8  -         Exercise 2    Exercise Name 2  Recheck  -         Exercise 3    Exercise Name 3  Ramp walk: fwd, bwd, lat  -      Cueing 3  Verbal  -      Sets 3  4 laps each  -         Exercise 4    Exercise Name 4  Ascend/descend flight of stairs  -      Cueing 4  Verbal  -      Sets 4  2  -      Reps 4  1 flight  -         Exercise 5    Exercise Name 5  Mini squats without UE use  -      Cueing 5  Verbal  -      Sets 5  1  -       Reps 5  20  -         Exercise 6    Exercise Name 6  HR without UE use  -      Cueing 6  Verbal  -      Sets 6  1  -      Reps 6  20  -MH         Exercise 7    Exercise Name 7  553  -      Cueing 7  Verbal  -         Exercise 8    Exercise Name 8  SLS rebounder on foam mat  -      Cueing 8  Verbal  -      Sets 8  2  -MH      Reps 8  10  -MH      Additional Comments  Increased ankle strategies  -         Exercise 9    Exercise Name 9  Ice  -        User Key  (r) = Recorded By, (t) = Taken By, (c) = Cosigned By    Initials Name Provider Type    Madan Alston, PT Physical Therapist                         PT OP Goals     Row Name 02/04/21 0900          PT Short Term Goals    STG Date to Achieve  -- STGs deferred  -        Long Term Goals    LTG Date to Achieve  02/10/21  -     LTG 1  Independent with self-management.  -     LTG 1 Progress  Met  -     LTG 2  R ankle AROM WNLs without pain.  -     LTG 2 Progress  Met  [facility]     LTG 3  Non-antalgic gait in regular shoes.  -     LTG 3 Progress  Met  [facility]     LTG 4  Resume light exercise in regular shoes.  -     LTG 4 Progress  Met  -     LTG 5  LEFS score to be >/= 50/80.  -     LTG 5 Progress  Met  -        Time Calculation    PT Goal Re-Cert Due Date  -- d/c  -       User Key  (r) = Recorded By, (t) = Taken By, (c) = Cosigned By    Initials Name Provider Type    Madan Alston, PT Physical Therapist          Therapy Education  Education Details: HEP updated and reviewed  Given: HEP  Program: Progressed, Reinforced  How Provided: Verbal, Demonstration  Provided to: Patient  Level of Understanding: Teach back education performed    Outcome Measure Options: Lower Extremity Functional Scale (LEFS)  Lower Extremity Functional Index  Any of your usual work, housework or school activities: No difficulty  Your usual hobbies, recreational or sporting activities: No difficulty  Getting into or out of the bath: No difficulty  Walking  between rooms: No difficulty  Putting on your shoes or socks: No difficulty  Squatting: No difficulty  Lifting an object, like a bag of groceries from the floor: No difficulty  Performing light activities around your home: No difficulty  Performing heavy activities around your home: A little bit of difficulty  Getting into or out of a car: No difficulty  Walking 2 blocks: No difficulty  Walking a mile: A little bit of difficulty  Going up or down 10 stairs (about 1 flight of stairs): No difficulty  Standing for 1 hour: No difficulty  Sitting for 1 hour: No difficulty  Running on even ground: A little bit of difficulty  Running on uneven ground: A little bit of difficulty  Making sharp turns while running fast: Moderate difficulty  Hopping: No difficulty  Rolling over in bed: No difficulty  Total: 74      Time Calculation:   Start Time: 0932  Stop Time: 1026  Time Calculation (min): 54 min  Therapy Charges for Today     Code Description Service Date Service Provider Modifiers Qty    51094877723 HC PT THER PROC EA 15 MIN 2/4/2021 Madan Lebron, PT GP 2    22676144687 HC PT THERAPEUTIC ACT EA 15 MIN 2/4/2021 Madan Lebron, PT GP 1    [id number]  PT HOT/COLD PACK WC NONMCARE 2/4/2021 Madan Lebron, PT GP 1          PT G-Codes  Outcome Measure Options: Lower Extremity Functional Scale (LEFS)  Total: 74     OP PT Discharge Summary  Date of Discharge: 02/04/21  Reason for Discharge: All goals achieved, Independent  Outcomes Achieved: Able to achieve all goals within established timeline, Refer to plan of care for updates on goals achieved  Discharge Destination: Home with home program      Madan Lebron PT  2/4/2021

## 2021-02-05 ENCOUNTER — TELEPHONE (OUTPATIENT)
Dept: FAMILY MEDICINE CLINIC | Facility: CLINIC | Age: 31
End: 2021-02-05

## 2021-02-05 NOTE — TELEPHONE ENCOUNTER
"PATIENT CALLING IN FOLLOWING UP ON [employer] MESSAGE SHE SENT IN ON 02/04. PLEASE ADVISE.         [employer] MESSAGE-     \"Mar,     I have an appointment scheduled for Wednesday but I wanted to make sure I didn't need to talk to you before. I was having some girl pains on Monday afternoon and took a naproxen to subside them. I made the mistake of not eating right when I took the pill but waited a while before I did. I've never had issues before but beginning that night I started having intense hunger pains that kept me up all night. Ever since then I have had the same pains but I have been able to sleep. I thought it was getting better because this was the first day that I hadn't had as bad pains until I ate a salad tonight. Should I be worried since it has been this long?     Thanks  Rosey\"    "

## 2021-02-08 ENCOUNTER — APPOINTMENT (OUTPATIENT)
Dept: PHYSICAL THERAPY | Facility: HOSPITAL | Age: 31
End: 2021-02-08

## 2021-02-10 ENCOUNTER — LAB (OUTPATIENT)
Dept: LAB | Facility: HOSPITAL | Age: 31
End: 2021-02-10

## 2021-02-10 ENCOUNTER — OFFICE VISIT (OUTPATIENT)
Dept: FAMILY MEDICINE CLINIC | Facility: CLINIC | Age: 31
End: 2021-02-10

## 2021-02-10 VITALS
BODY MASS INDEX: 27.94 KG/M2 | HEIGHT: 61 IN | SYSTOLIC BLOOD PRESSURE: 106 MMHG | TEMPERATURE: 99.1 F | WEIGHT: 148 LBS | DIASTOLIC BLOOD PRESSURE: 80 MMHG

## 2021-02-10 DIAGNOSIS — R10.11 RUQ PAIN: Primary | ICD-10-CM

## 2021-02-10 LAB
ALBUMIN SERPL-MCNC: 4.1 G/DL (ref 3.5–5.2)
ALBUMIN/GLOB SERPL: 1.3 G/DL
ALP SERPL-CCNC: 72 U/L (ref 39–117)
ALT SERPL W P-5'-P-CCNC: 16 U/L (ref 1–33)
AMYLASE SERPL-CCNC: 66 U/L (ref 28–100)
ANION GAP SERPL CALCULATED.3IONS-SCNC: 9 MMOL/L (ref 5–15)
AST SERPL-CCNC: 13 U/L (ref 1–32)
BASOPHILS # BLD AUTO: 0.05 10*3/MM3 (ref 0–0.2)
BASOPHILS NFR BLD AUTO: 0.7 % (ref 0–1.5)
BILIRUB SERPL-MCNC: 0.2 MG/DL (ref 0–1.2)
BUN SERPL-MCNC: 14 MG/DL (ref 6–20)
BUN/CREAT SERPL: 18.4 (ref 7–25)
CALCIUM SPEC-SCNC: 8.9 MG/DL (ref 8.6–10.5)
CHLORIDE SERPL-SCNC: 104 MMOL/L (ref 98–107)
CO2 SERPL-SCNC: 25 MMOL/L (ref 22–29)
CREAT SERPL-MCNC: 0.76 MG/DL (ref 0.57–1)
DEPRECATED RDW RBC AUTO: 45 FL (ref 37–54)
EOSINOPHIL # BLD AUTO: 0.08 10*3/MM3 (ref 0–0.4)
EOSINOPHIL NFR BLD AUTO: 1.1 % (ref 0.3–6.2)
ERYTHROCYTE [DISTWIDTH] IN BLOOD BY AUTOMATED COUNT: 14 % (ref 12.3–15.4)
GFR SERPL CREATININE-BSD FRML MDRD: 89 ML/MIN/1.73
GLOBULIN UR ELPH-MCNC: 3.2 GM/DL
GLUCOSE SERPL-MCNC: 92 MG/DL (ref 65–99)
HCT VFR BLD AUTO: 39.5 % (ref 34–46.6)
HGB BLD-MCNC: 13.4 G/DL (ref 12–15.9)
IMM GRANULOCYTES # BLD AUTO: 0.02 10*3/MM3 (ref 0–0.05)
IMM GRANULOCYTES NFR BLD AUTO: 0.3 % (ref 0–0.5)
IRON 24H UR-MRATE: 70 MCG/DL (ref 37–145)
LIPASE SERPL-CCNC: 45 U/L (ref 13–60)
LYMPHOCYTES # BLD AUTO: 3.09 10*3/MM3 (ref 0.7–3.1)
LYMPHOCYTES NFR BLD AUTO: 40.9 % (ref 19.6–45.3)
MCH RBC QN AUTO: 29.8 PG (ref 26.6–33)
MCHC RBC AUTO-ENTMCNC: 33.9 G/DL (ref 31.5–35.7)
MCV RBC AUTO: 87.8 FL (ref 79–97)
MONOCYTES # BLD AUTO: 0.39 10*3/MM3 (ref 0.1–0.9)
MONOCYTES NFR BLD AUTO: 5.2 % (ref 5–12)
NEUTROPHILS NFR BLD AUTO: 3.92 10*3/MM3 (ref 1.7–7)
NEUTROPHILS NFR BLD AUTO: 51.8 % (ref 42.7–76)
NRBC BLD AUTO-RTO: 0 /100 WBC (ref 0–0.2)
PLATELET # BLD AUTO: 164 10*3/MM3 (ref 140–450)
PMV BLD AUTO: 11.6 FL (ref 6–12)
POTASSIUM SERPL-SCNC: 4.1 MMOL/L (ref 3.5–5.2)
PROT SERPL-MCNC: 7.3 G/DL (ref 6–8.5)
RBC # BLD AUTO: 4.5 10*6/MM3 (ref 3.77–5.28)
SODIUM SERPL-SCNC: 138 MMOL/L (ref 136–145)
TSH SERPL DL<=0.05 MIU/L-ACNC: 0.89 UIU/ML (ref 0.27–4.2)
WBC # BLD AUTO: 7.55 10*3/MM3 (ref 3.4–10.8)

## 2021-02-10 PROCEDURE — 83690 ASSAY OF LIPASE: CPT | Performed by: NURSE PRACTITIONER

## 2021-02-10 PROCEDURE — 99213 OFFICE O/P EST LOW 20 MIN: CPT | Performed by: NURSE PRACTITIONER

## 2021-02-10 PROCEDURE — 84443 ASSAY THYROID STIM HORMONE: CPT | Performed by: NURSE PRACTITIONER

## 2021-02-10 PROCEDURE — 85025 COMPLETE CBC W/AUTO DIFF WBC: CPT | Performed by: NURSE PRACTITIONER

## 2021-02-10 PROCEDURE — 83540 ASSAY OF IRON: CPT | Performed by: NURSE PRACTITIONER

## 2021-02-10 PROCEDURE — 80053 COMPREHEN METABOLIC PANEL: CPT | Performed by: NURSE PRACTITIONER

## 2021-02-10 PROCEDURE — 36415 COLL VENOUS BLD VENIPUNCTURE: CPT | Performed by: NURSE PRACTITIONER

## 2021-02-10 PROCEDURE — 82150 ASSAY OF AMYLASE: CPT | Performed by: NURSE PRACTITIONER

## 2021-02-10 NOTE — PROGRESS NOTES
Chief Complaint   Patient presents with   • GI Problem     stomach pain , right side     Subjective   Rosey Aguilar is a 30 y.o. female.     GI Problem  The primary symptoms include fatigue, abdominal pain, nausea and diarrhea. Primary symptoms do not include fever, weight loss, vomiting, melena, hematochezia, dysuria, myalgias or arthralgias.   The illness is also significant for anorexia. The illness does not include constipation or back pain.   Abdominal Pain  This is a recurrent problem. The current episode started in the past 7 days. The onset quality is sudden. The problem occurs daily. The most recent episode lasted 3 days. The problem has been waxing and waning. The pain is located in the RUQ and right flank. The pain is at a severity of 8/10. The quality of the pain is aching, burning and cramping. The abdominal pain radiates to the RLQ, back, right flank and pelvis. Associated symptoms include anorexia, diarrhea, flatus and nausea. Pertinent negatives include no arthralgias, belching, constipation, dysuria, fever, headaches, hematochezia, melena, myalgias, vomiting or weight loss. The pain is aggravated by certain positions, eating and NSAIDs. The pain is relieved by nothing.        The following portions of the patient's history were reviewed and updated as appropriate: allergies, current medications, past social history and problem list.    Review of Systems   Constitutional: Positive for fatigue. Negative for fever and weight loss.   Eyes: Negative.    Respiratory: Negative.    Cardiovascular: Negative.    Gastrointestinal: Positive for abdominal distention, abdominal pain, anorexia, diarrhea, flatus and nausea. Negative for constipation, hematochezia, melena and vomiting.   Endocrine: Negative.  Negative for heat intolerance, polydipsia, polyphagia and polyuria.   Genitourinary: Negative.  Negative for difficulty urinating, dyspareunia and dysuria.   Musculoskeletal: Negative for arthralgias, back  "pain and myalgias.   Allergic/Immunologic: Negative for environmental allergies, food allergies and immunocompromised state.   Neurological: Negative.  Negative for dizziness, seizures, light-headedness, numbness and headaches.   Hematological: Negative.  Negative for adenopathy. Does not bruise/bleed easily.   Psychiatric/Behavioral: Negative.  Negative for agitation, behavioral problems and confusion.       Objective   /80   Temp 99.1 °F (37.3 °C) (Tympanic)   Ht 154.9 cm (61\")   Wt 67.1 kg (148 lb)   BMI 27.96 kg/m²   Physical Exam  Vitals signs and nursing note reviewed.   HENT:      Head: Normocephalic.      Nose: Nose normal.      Mouth/Throat:      Mouth: Mucous membranes are moist.   Eyes:      Pupils: Pupils are equal, round, and reactive to light.   Neck:      Musculoskeletal: Normal range of motion.   Cardiovascular:      Rate and Rhythm: Normal rate.      Pulses: Normal pulses.   Pulmonary:      Effort: Pulmonary effort is normal.      Breath sounds: Normal breath sounds.   Abdominal:      General: There is no distension.      Palpations: There is no mass.      Tenderness: There is abdominal tenderness in the right upper quadrant and epigastric area. There is no right CVA tenderness, left CVA tenderness, guarding or rebound. Negative signs include Tate's sign, McBurney's sign, psoas sign and obturator sign.      Hernia: No hernia is present.       Skin:     General: Skin is warm.   Neurological:      General: No focal deficit present.      Mental Status: She is alert and oriented to person, place, and time.         Assessment/Plan   Problems Addressed this Visit     None      Visit Diagnoses     RUQ pain    -  Primary    Relevant Orders    CBC & Differential (Completed)    Comprehensive Metabolic Panel (Completed)    TSH (Completed)    Iron (Completed)    US Gallbladder    Ambulatory Referral to Gastroenterology    Amylase (Completed)    Lipase (Completed)    CBC Auto Differential (Completed) "      Diagnoses       Codes Comments    RUQ pain    -  Primary ICD-10-CM: R10.11  ICD-9-CM: 789.01          No orders of the defined types were placed in this encounter.       It's not just what you eat, but when you eat  Eat breakfast, and eat smaller meals throughout the day. A healthy breakfast can jumpstart your metabolism, while eating small, healthy meals (rather than the standard three large meals) keeps your energy up.   Avoid eating at night. Try to eat dinner earlier and fast for 14-16 hours until breakfast the next morning. Studies suggest that eating only when you’re most active and giving your digestive system a long break each day may help to regulate weight.     Plan, labs as directed, us of abdomen and gi consult as directed, diet discussed in length patient agrees     Scheduled for uterine ablation end of this month

## 2021-02-11 ENCOUNTER — APPOINTMENT (OUTPATIENT)
Dept: PHYSICAL THERAPY | Facility: HOSPITAL | Age: 31
End: 2021-02-11

## 2021-02-16 ENCOUNTER — TELEPHONE (OUTPATIENT)
Dept: FAMILY MEDICINE CLINIC | Facility: CLINIC | Age: 31
End: 2021-02-16

## 2021-02-16 ENCOUNTER — HOSPITAL ENCOUNTER (OUTPATIENT)
Dept: ULTRASOUND IMAGING | Facility: HOSPITAL | Age: 31
Discharge: HOME | End: 2021-02-16
Admitting: NURSE PRACTITIONER

## 2021-02-16 PROCEDURE — 76705 ECHO EXAM OF ABDOMEN: CPT

## 2021-02-16 NOTE — TELEPHONE ENCOUNTER
Per FREDY Hart, Ms. Aguilar has been called with recent Gallbladder US results & recommendations.  Continue current medications and follow-up as planned or sooner if any problems.    Yes, Go ahead and order the HIDA Scan        ----- Message from FREDY Curry sent at 2/16/2021  3:22 PM CST -----  Regarding: FW:  Can you let her know this is good. See if she wants a hida scan  ----- Message -----  From: Chato, Rad Results [place] In  Sent: 2/16/2021  11:59 AM CST  To: FREDY Curry

## 2021-02-16 NOTE — PROGRESS NOTES
Per FREDY Hart, Ms. Aguilar has been called with recent Gallbladder US results & recommendations.  Continue current medications and follow-up as planned or sooner if any problems.    Yes, Go ahead and order the HIDA Scan

## 2021-02-17 DIAGNOSIS — R10.13 EPIGASTRIC PAIN: Primary | ICD-10-CM

## 2021-02-18 ENCOUNTER — OFFICE VISIT (OUTPATIENT)
Dept: GASTROENTEROLOGY | Facility: CLINIC | Age: 31
End: 2021-02-18

## 2021-02-18 VITALS
SYSTOLIC BLOOD PRESSURE: 126 MMHG | BODY MASS INDEX: 27.75 KG/M2 | DIASTOLIC BLOOD PRESSURE: 78 MMHG | HEIGHT: 61 IN | WEIGHT: 147 LBS | HEART RATE: 76 BPM

## 2021-02-18 DIAGNOSIS — R19.7 DIARRHEA, UNSPECIFIED TYPE: ICD-10-CM

## 2021-02-18 DIAGNOSIS — Z80.0 FAMILY HISTORY OF COLON CANCER: ICD-10-CM

## 2021-02-18 DIAGNOSIS — R10.84 GENERALIZED ABDOMINAL PAIN: Primary | ICD-10-CM

## 2021-02-18 DIAGNOSIS — K92.1 BLOOD IN STOOL: ICD-10-CM

## 2021-02-18 PROCEDURE — 99213 OFFICE O/P EST LOW 20 MIN: CPT | Performed by: NURSE PRACTITIONER

## 2021-02-18 RX ORDER — DEXTROSE AND SODIUM CHLORIDE 5; .45 G/100ML; G/100ML
30 INJECTION, SOLUTION INTRAVENOUS CONTINUOUS PRN
Status: CANCELLED | OUTPATIENT
Start: 2021-03-15

## 2021-02-18 NOTE — PROGRESS NOTES
Chief Complaint   Patient presents with   • Abdominal Pain     Ref. K Yash APRN   • Nausea   • Diarrhea       Subjective    Rosey Aguilar is a 30 y.o. female. she is here today for follow-up.  30-year-old female presents to discuss abdominal pain nausea diarrhea and family history of colon cancer.  She was first by PCP ultrasound and lab work was normal.  She has HIDA scan scheduled for Monday.  Denies any current abdominal pain but reports there is always residual soreness and at onset she had severe sharp pain that has slightly improved.    Abdominal Pain  This is a new problem. The current episode started 1 to 4 weeks ago (Started 6 months and recently became severe ). The onset quality is gradual. The problem occurs intermittently. The pain is located in the RUQ and epigastric region. The quality of the pain is colicky, cramping and a sensation of fullness. The abdominal pain radiates to the LLQ, LUQ, RLQ and RUQ. Associated symptoms include diarrhea (1-5/6 per day ), flatus and nausea. Pertinent negatives include no anorexia, arthralgias, belching, constipation, dysuria, fever, frequency, headaches, hematochezia, hematuria, melena, myalgias, vomiting or weight loss. The pain is aggravated by certain positions (mexican and dairy ). Prior diagnostic workup includes ultrasound (labs wnl- cbc, cmp, amylase, lipase ). Her past medical history is significant for colon cancer (family history ) and irritable bowel syndrome.   Nausea  Associated symptoms include abdominal pain and nausea. Pertinent negatives include no anorexia, arthralgias, chills, diaphoresis, fatigue, fever, headaches, myalgias, sore throat or vomiting.   Diarrhea   Associated symptoms include abdominal pain and increased flatus. Pertinent negatives include no arthralgias, chills, fever, headaches, myalgias, vomiting or weight loss. Her past medical history is significant for irritable bowel syndrome.     Plan; schedule patient for EGD and  colonoscopy due to abdominal pain nausea diarrhea blood in stool and family history of colon cancer       The following portions of the patient's history were reviewed and updated as appropriate:   Past Medical History:   Diagnosis Date   • Anxiety    • Encounter for  visit    • Pneumonia, histoplasma (CMS/HCC)     on sporonox      • Solitary lung nodule     RIGHT lower lobe      • Supervision of normal first pregnancy      Past Surgical History:   Procedure Laterality Date   •  SECTION     • LAPAROSCOPIC APPENDECTOMY  2012    Acute appendicitis   • PAP SMEAR  2010    negative   • WISDOM TOOTH EXTRACTION       Family History   Problem Relation Age of Onset   • No Known Problems Mother    • No Known Problems Father    • Colon cancer Maternal Aunt    • Breast cancer Paternal Aunt    • Breast cancer Other    • Colon cancer Other    • Diabetes Other    • Heart disease Other    • Hypertension Other    • Stroke Other    • Gallbladder disease Other         stones     OB History    No obstetric history on file.       Prior to Admission medications    Medication Sig Start Date End Date Taking? Authorizing Provider   buPROPion XL (WELLBUTRIN XL) 300 MG 24 hr tablet Take 300 mg by mouth Daily. 19  Yes Provider, MD Tera   cetirizine (ZyrTEC Allergy) 10 MG tablet Take 10 mg by mouth Daily.   Yes Provider, MD Tera   FLUoxetine (PROzac) 20 MG capsule Take 40 mg by mouth Daily. 10/16/20  Yes Provider, MD Tera   naproxen (NAPROSYN) 500 MG tablet TAKE 1 TABLET BY MOUTH 2 (TWO) TIMES A DAY AS NEEDED FOR MODERATE PAIN . 20  Yes Fabrice Langley DPM     Allergies   Allergen Reactions   • Ceclor [Cefaclor] Rash     Social History     Socioeconomic History   • Marital status:      Spouse name: Not on file   • Number of children: Not on file   • Years of education: Not on file   • Highest education level: Not on file   Tobacco Use   • Smoking status: Never Smoker   •  "Smokeless tobacco: Never Used   • Tobacco comment: No exposure to environmental tobacco smoke   Substance and Sexual Activity   • Alcohol use: No   • Drug use: Never   • Sexual activity: Defer       Review of Systems  Review of Systems   Constitutional: Negative for activity change, appetite change, chills, diaphoresis, fatigue, fever, unexpected weight change and weight loss.   HENT: Negative for sore throat and trouble swallowing.    Respiratory: Negative for shortness of breath.    Gastrointestinal: Positive for abdominal pain, diarrhea (1-5/6 per day ), flatus and nausea. Negative for abdominal distention, anal bleeding, anorexia, blood in stool, constipation, hematochezia, melena, rectal pain and vomiting.   Genitourinary: Negative for dysuria, frequency and hematuria.   Musculoskeletal: Negative for arthralgias and myalgias.   Skin: Negative for pallor.   Neurological: Negative for light-headedness and headaches.        /78   Pulse 76   Ht 154.9 cm (61\")   Wt 66.7 kg (147 lb)   LMP 02/18/2021   BMI 27.78 kg/m²     Objective    Physical Exam  Constitutional:       General: She is not in acute distress.     Appearance: Normal appearance. She is well-developed.   HENT:      Head: Normocephalic and atraumatic.   Neck:      Musculoskeletal: Normal range of motion and neck supple.      Thyroid: No thyromegaly.   Pulmonary:      Effort: Pulmonary effort is normal.   Abdominal:      General: Bowel sounds are normal. There is no distension.      Palpations: Abdomen is soft. Abdomen is not rigid.      Tenderness: There is generalized abdominal tenderness (diffuse tenderness to palpation. pain in RUQ ) and tenderness in the right upper quadrant. There is no guarding.      Hernia: No hernia is present.   Lymphadenopathy:      Cervical: No cervical adenopathy.   Skin:     General: Skin is warm and dry.      Coloration: Skin is not pale.      Findings: No rash.   Neurological:      Mental Status: She is alert and " oriented to person, place, and time.   Psychiatric:         Speech: Speech normal.         Behavior: Behavior is cooperative.       Office Visit on 02/10/2021   Component Date Value Ref Range Status   • Glucose 02/10/2021 92  65 - 99 mg/dL Final   • BUN 02/10/2021 14  6 - 20 mg/dL Final   • Creatinine 02/10/2021 0.76  0.57 - 1.00 mg/dL Final   • Sodium 02/10/2021 138  136 - 145 mmol/L Final   • Potassium 02/10/2021 4.1  3.5 - 5.2 mmol/L Final   • Chloride 02/10/2021 104  98 - 107 mmol/L Final   • CO2 02/10/2021 25.0  22.0 - 29.0 mmol/L Final   • Calcium 02/10/2021 8.9  8.6 - 10.5 mg/dL Final   • Total Protein 02/10/2021 7.3  6.0 - 8.5 g/dL Final   • Albumin 02/10/2021 4.10  3.50 - 5.20 g/dL Final   • ALT (SGPT) 02/10/2021 16  1 - 33 U/L Final   • AST (SGOT) 02/10/2021 13  1 - 32 U/L Final   • Alkaline Phosphatase 02/10/2021 72  39 - 117 U/L Final   • Total Bilirubin 02/10/2021 0.2  0.0 - 1.2 mg/dL Final   • eGFR Non African Amer 02/10/2021 89  >60 mL/min/1.73 Final   • Globulin 02/10/2021 3.2  gm/dL Final   • A/G Ratio 02/10/2021 1.3  g/dL Final   • BUN/Creatinine Ratio 02/10/2021 18.4  7.0 - 25.0 Final   • Anion Gap 02/10/2021 9.0  5.0 - 15.0 mmol/L Final   • TSH 02/10/2021 0.893  0.270 - 4.200 uIU/mL Final   • Iron 02/10/2021 70  37 - 145 mcg/dL Final   • Amylase 02/10/2021 66  28 - 100 U/L Final   • Lipase 02/10/2021 45  13 - 60 U/L Final   • WBC 02/10/2021 7.55  3.40 - 10.80 10*3/mm3 Final   • RBC 02/10/2021 4.50  3.77 - 5.28 10*6/mm3 Final   • Hemoglobin 02/10/2021 13.4  12.0 - 15.9 g/dL Final   • Hematocrit 02/10/2021 39.5  34.0 - 46.6 % Final   • MCV 02/10/2021 87.8  79.0 - 97.0 fL Final   • MCH 02/10/2021 29.8  26.6 - 33.0 pg Final   • MCHC 02/10/2021 33.9  31.5 - 35.7 g/dL Final   • RDW 02/10/2021 14.0  12.3 - 15.4 % Final   • RDW-SD 02/10/2021 45.0  37.0 - 54.0 fl Final   • MPV 02/10/2021 11.6  6.0 - 12.0 fL Final   • Platelets 02/10/2021 164  140 - 450 10*3/mm3 Final   • Neutrophil % 02/10/2021 51.8  42.7  - 76.0 % Final   • Lymphocyte % 02/10/2021 40.9  19.6 - 45.3 % Final   • Monocyte % 02/10/2021 5.2  5.0 - 12.0 % Final   • Eosinophil % 02/10/2021 1.1  0.3 - 6.2 % Final   • Basophil % 02/10/2021 0.7  0.0 - 1.5 % Final   • Immature Grans % 02/10/2021 0.3  0.0 - 0.5 % Final   • Neutrophils, Absolute 02/10/2021 3.92  1.70 - 7.00 10*3/mm3 Final   • Lymphocytes, Absolute 02/10/2021 3.09  0.70 - 3.10 10*3/mm3 Final   • Monocytes, Absolute 02/10/2021 0.39  0.10 - 0.90 10*3/mm3 Final   • Eosinophils, Absolute 02/10/2021 0.08  0.00 - 0.40 10*3/mm3 Final   • Basophils, Absolute 02/10/2021 0.05  0.00 - 0.20 10*3/mm3 Final   • Immature Grans, Absolute 02/10/2021 0.02  0.00 - 0.05 10*3/mm3 Final   • nRBC 02/10/2021 0.0  0.0 - 0.2 /100 WBC Final     Assessment/Plan      1. Generalized abdominal pain    2. Diarrhea, unspecified type    3. Blood in stool    4. Family history of colon cancer    .       Orders placed during this encounter include:  Orders Placed This Encounter   Procedures   • Follow Anesthesia Guidelines / Standing Orders     Standing Status:   Future   • Obtain Informed Consent     Standing Status:   Future     Order Specific Question:   Informed Consent Given For     Answer:   ESOPHAGOGASTRODUODENOSCOPY and Colonoscopy       ESOPHAGOGASTRODUODENOSCOPY (N/A), COLONOSCOPY (N/A)    Review and/or summary of lab tests, radiology, procedures, medications. Review and summary of old records and obtaining of history. The risks and benefits of my recommendations, as well as other treatment options were discussed with the patient today. Questions were answered.    New Medications Ordered This Visit   Medications   • Sod Picosulfate-Mag Ox-Cit Acd 10-3.5-12 MG-GM -GM/160ML solution     Sig: Take 1 bottle by mouth 1 (One) Time for 1 dose. See admin instructions     Dispense:  160 mL     Refill:  0       Follow-up: Return in about 4 weeks (around 3/18/2021) for Recheck, After test.          This document has been  electronically signed by FREDY Ford on February 18, 2021 15:07 CST           I spent 24 minutes caring for Rosey on this date of service. This time includes time spent by me in the following activities:preparing for the visit, reviewing tests, obtaining and/or reviewing a separately obtained history, performing a medically appropriate examination and/or evaluation , counseling and educating the patient/family/caregiver, ordering medications, tests, or procedures, documenting information in the medical record and care coordination    Results for orders placed or performed in visit on 02/10/21   CBC Auto Differential    Specimen: Blood   Result Value Ref Range    WBC 7.55 3.40 - 10.80 10*3/mm3    RBC 4.50 3.77 - 5.28 10*6/mm3    Hemoglobin 13.4 12.0 - 15.9 g/dL    Hematocrit 39.5 34.0 - 46.6 %    MCV 87.8 79.0 - 97.0 fL    MCH 29.8 26.6 - 33.0 pg    MCHC 33.9 31.5 - 35.7 g/dL    RDW 14.0 12.3 - 15.4 %    RDW-SD 45.0 37.0 - 54.0 fl    MPV 11.6 6.0 - 12.0 fL    Platelets 164 140 - 450 10*3/mm3    Neutrophil % 51.8 42.7 - 76.0 %    Lymphocyte % 40.9 19.6 - 45.3 %    Monocyte % 5.2 5.0 - 12.0 %    Eosinophil % 1.1 0.3 - 6.2 %    Basophil % 0.7 0.0 - 1.5 %    Immature Grans % 0.3 0.0 - 0.5 %    Neutrophils, Absolute 3.92 1.70 - 7.00 10*3/mm3    Lymphocytes, Absolute 3.09 0.70 - 3.10 10*3/mm3    Monocytes, Absolute 0.39 0.10 - 0.90 10*3/mm3    Eosinophils, Absolute 0.08 0.00 - 0.40 10*3/mm3    Basophils, Absolute 0.05 0.00 - 0.20 10*3/mm3    Immature Grans, Absolute 0.02 0.00 - 0.05 10*3/mm3    nRBC 0.0 0.0 - 0.2 /100 WBC   TSH    Specimen: Blood   Result Value Ref Range    TSH 0.893 0.270 - 4.200 uIU/mL   Lipase    Specimen: Blood   Result Value Ref Range    Lipase 45 13 - 60 U/L   Iron    Specimen: Blood   Result Value Ref Range    Iron 70 37 - 145 mcg/dL   Amylase    Specimen: Blood   Result Value Ref Range    Amylase 66 28 - 100 U/L   Comprehensive Metabolic Panel    Specimen: Blood   Result Value Ref Range     Glucose 92 65 - 99 mg/dL    BUN 14 6 - 20 mg/dL    Creatinine 0.76 0.57 - 1.00 mg/dL    Sodium 138 136 - 145 mmol/L    Potassium 4.1 3.5 - 5.2 mmol/L    Chloride 104 98 - 107 mmol/L    CO2 25.0 22.0 - 29.0 mmol/L    Calcium 8.9 8.6 - 10.5 mg/dL    Total Protein 7.3 6.0 - 8.5 g/dL    Albumin 4.10 3.50 - 5.20 g/dL    ALT (SGPT) 16 1 - 33 U/L    AST (SGOT) 13 1 - 32 U/L    Alkaline Phosphatase 72 39 - 117 U/L    Total Bilirubin 0.2 0.0 - 1.2 mg/dL    eGFR Non African Amer 89 >60 mL/min/1.73    Globulin 3.2 gm/dL    A/G Ratio 1.3 g/dL    BUN/Creatinine Ratio 18.4 7.0 - 25.0    Anion Gap 9.0 5.0 - 15.0 mmol/L   Results for orders placed or performed in visit on 08/05/20   CBC Auto Differential    Specimen: Blood   Result Value Ref Range    WBC 8.21 3.40 - 10.80 10*3/mm3    RBC 4.72 3.77 - 5.28 10*6/mm3    Hemoglobin 14.1 12.0 - 15.9 g/dL    Hematocrit 41.0 34.0 - 46.6 %    MCV 86.9 79.0 - 97.0 fL    MCH 29.9 26.6 - 33.0 pg    MCHC 34.4 31.5 - 35.7 g/dL    RDW 13.0 12.3 - 15.4 %    RDW-SD 40.6 37.0 - 54.0 fl    MPV 12.0 6.0 - 12.0 fL    Platelets 141 140 - 450 10*3/mm3    Neutrophil % 59.6 42.7 - 76.0 %    Lymphocyte % 34.0 19.6 - 45.3 %    Monocyte % 5.1 5.0 - 12.0 %    Eosinophil % 0.4 0.3 - 6.2 %    Basophil % 0.5 0.0 - 1.5 %    Immature Grans % 0.4 0.0 - 0.5 %    Neutrophils, Absolute 4.90 1.70 - 7.00 10*3/mm3    Lymphocytes, Absolute 2.79 0.70 - 3.10 10*3/mm3    Monocytes, Absolute 0.42 0.10 - 0.90 10*3/mm3    Eosinophils, Absolute 0.03 0.00 - 0.40 10*3/mm3    Basophils, Absolute 0.04 0.00 - 0.20 10*3/mm3    Immature Grans, Absolute 0.03 0.00 - 0.05 10*3/mm3    nRBC 0.0 0.0 - 0.2 /100 WBC   Vitamin D 25 Hydroxy    Specimen: Blood   Result Value Ref Range    25 Hydroxy, Vitamin D 30.6 30.0 - 100.0 ng/ml   T3    Specimen: Blood   Result Value Ref Range    T3, Total 120.0 80.0 - 200.0 ng/dl   TSH    Specimen: Blood   Result Value Ref Range    TSH 0.722 0.270 - 4.200 uIU/mL   T4, Free    Specimen: Blood   Result Value  Ref Range    Free T4 1.08 0.93 - 1.70 ng/dL   Magnesium    Specimen: Blood   Result Value Ref Range    Magnesium 2.2 1.6 - 2.6 mg/dL   Iron    Specimen: Blood   Result Value Ref Range    Iron 88 37 - 145 mcg/dL   Vitamin B12    Specimen: Blood   Result Value Ref Range    Vitamin B-12 671 211 - 946 pg/mL   Lipid Panel    Specimen: Blood   Result Value Ref Range    Total Cholesterol 181 0 - 200 mg/dL    Triglycerides 62 0 - 150 mg/dL    HDL Cholesterol 47 40 - 60 mg/dL    LDL Cholesterol  122 (H) 0 - 100 mg/dL    VLDL Cholesterol 12.4 5 - 40 mg/dL    LDL/HDL Ratio 2.59    Comprehensive Metabolic Panel    Specimen: Blood   Result Value Ref Range    Glucose 86 65 - 99 mg/dL    BUN 10 6 - 20 mg/dL    Creatinine 0.83 0.57 - 1.00 mg/dL    Sodium 136 136 - 145 mmol/L    Potassium 4.4 3.5 - 5.2 mmol/L    Chloride 105 98 - 107 mmol/L    CO2 22.9 22.0 - 29.0 mmol/L    Calcium 9.3 8.6 - 10.5 mg/dL    Total Protein 7.2 6.0 - 8.5 g/dL    Albumin 4.30 3.50 - 5.20 g/dL    ALT (SGPT) 17 1 - 33 U/L    AST (SGOT) 10 1 - 32 U/L    Alkaline Phosphatase 60 39 - 117 U/L    Total Bilirubin 0.4 0.0 - 1.2 mg/dL    eGFR Non African Amer 80 >60 mL/min/1.73    Globulin 2.9 gm/dL    A/G Ratio 1.5 g/dL    BUN/Creatinine Ratio 12.0 7.0 - 25.0    Anion Gap 8.1 5.0 - 15.0 mmol/L     *Note: Due to a large number of results and/or encounters for the requested time period, some results have not been displayed. A complete set of results can be found in Results Review.

## 2021-02-18 NOTE — PATIENT INSTRUCTIONS
MyPlate from USDA    MyPlate is an outline of a general healthy diet based on the 2010 Dietary Guidelines for Americans, from the [place] Department of Agriculture (USDA). It sets guidelines for how much food you should eat from each food group based on your age, sex, and level of physical activity.  What are tips for following MyPlate?  To follow MyPlate recommendations:  · Eat a wide variety of fruits and vegetables, grains, and protein foods.  · Serve smaller portions and eat less food throughout the day.  · Limit portion sizes to avoid overeating.  · Enjoy your food.  · Get at least 150 minutes of exercise every week. This is about 30 minutes each day, 5 or more days per week.  It can be difficult to have every meal look like MyPlate. Think about MyPlate as eating guidelines for an entire day, rather than each individual meal.  Fruits and vegetables  · Make half of your plate fruits and vegetables.  · Eat many different colors of fruits and vegetables each day.  · For a 2,000 calorie daily food plan, eat:  ? 2½ cups of vegetables every day.  ? 2 cups of fruit every day.  · 1 cup is equal to:  ? 1 cup raw or cooked vegetables.  ? 1 cup raw fruit.  ? 1 medium-sized orange, apple, or banana.  ? 1 cup 100% fruit or vegetable juice.  ? 2 cups raw leafy greens, such as lettuce, spinach, or kale.  ? ½ cup dried fruit.  Grains  · One fourth of your plate should be grains.  · Make at least half of the grains you eat each day whole grains.  · For a 2,000 calorie daily food plan, eat 6 oz of grains every day.  · 1 oz is equal to:  ? 1 slice bread.  ? 1 cup cereal.  ? ½ cup cooked rice, cereal, or pasta.  Protein  · One fourth of your plate should be protein.  · Eat a wide variety of protein foods, including meat, poultry, fish, eggs, beans, nuts, and tofu.  · For a 2,000 calorie daily food plan, eat 5½ oz of protein every day.  · 1 oz is equal to:  ? 1 oz meat, poultry, or fish.  ? ¼ cup cooked beans.  ? 1 egg.  ? ½ oz nuts  or seeds.  ? 1 Tbsp peanut butter.  Dairy  · Drink fat-free or low-fat (1%) milk.  · Eat or drink dairy as a side to meals.  · For a 2,000 calorie daily food plan, eat or drink 3 cups of dairy every day.  · 1 cup is equal to:  ? 1 cup milk, yogurt, cottage cheese, or soy milk (soy beverage).  ? 2 oz processed cheese.  ? 1½ oz natural cheese.  Fats, oils, salt, and sugars  · Only small amounts of oils are recommended.  · Avoid foods that are high in calories and low in nutritional value (empty calories), like foods high in fat or added sugars.  · Choose foods that are low in salt (sodium). Choose foods that have less than 140 milligrams (mg) of sodium per serving.  · Drink water instead of sugary drinks. Drink enough water each day to keep your urine pale yellow.  Where to find support  · Work with your health care provider or a nutrition specialist (dietitian) to develop a customized eating plan that is right for you.  · Download an jeremiah (mobile application) to help you track your daily food intake.  Where to find more information  · Go to ChooseMyPlate.gov for more information.  Summary  · MyPlate is a general guideline for healthy eating from the USDA. It is based on the 2010 Dietary Guidelines for Americans.  · In general, fruits and vegetables should take up ½ of your plate, grains should take up ¼ of your plate, and protein should take up ¼ of your plate.  This information is not intended to replace advice given to you by your health care provider. Make sure you discuss any questions you have with your health care provider.  Document Revised: 05/21/2020 Document Reviewed: 03/19/2018  Elsevier Patient Education © 2020 [employer]  BMI for Adults  What is BMI?  Body mass index (BMI) is a number that is calculated from a person's weight and height. BMI can help estimate how much of a person's weight is composed of fat. BMI does not measure body fat directly. Rather, it is an alternative to procedures that  "directly measure body fat, which can be difficult and expensive.  BMI can help identify people who may be at higher risk for certain medical problems.  What are BMI measurements used for?  BMI is used as a screening tool to identify possible weight problems. It helps determine whether a person is obese, overweight, a healthy weight, or underweight.  BMI is useful for:  · Identifying a weight problem that may be related to a medical condition or may increase the risk for medical problems.  · Promoting changes, such as changes in diet and exercise, to help reach a healthy weight. BMI screening can be repeated to see if these changes are working.  How is BMI calculated?  BMI involves measuring your weight in relation to your height. Both height and weight are measured, and the BMI is calculated from those numbers. This can be done either in English ([place] or metric measurements. Note that charts and online BMI calculators are available to help you find your BMI quickly and easily without having to do these calculations yourself.  To calculate your BMI in English ([place] measurements:    1. Measure your weight in pounds (lb).  2. Multiply the number of pounds by 703.  ? For example, for a person who weighs 180 lb, multiply that number by 703, which equals 126,540.  3. Measure your height in inches. Then multiply that number by itself to get a measurement called \"inches squared.\"  ? For example, for a person who is 70 inches tall, the \"inches squared\" measurement is 70 inches x 70 inches, which equals 4,900 inches squared.  4. Divide the total from step 2 (number of lb x 703) by the total from step 3 (inches squared): 126,540 ÷ 4,900 = 25.8. This is your BMI.  To calculate your BMI in metric measurements:  1. Measure your weight in kilograms (kg).  2. Measure your height in meters (m). Then multiply that number by itself to get a measurement called \"meters squared.\"  ? For example, for a person who is 1.75 m tall, the " "\"meters squared\" measurement is 1.75 m x 1.75 m, which is equal to 3.1 meters squared.  3. Divide the number of kilograms (your weight) by the meters squared number. In this example: 70 ÷ 3.1 = 22.6. This is your BMI.  What do the results mean?  BMI charts are used to identify whether you are underweight, normal weight, overweight, or obese. The following guidelines will be used:  · Underweight: BMI less than 18.5.  · Normal weight: BMI between 18.5 and 24.9.  · Overweight: BMI between 25 and 29.9.  · Obese: BMI of 30 or above.  Keep these notes in mind:  · Weight includes both fat and muscle, so someone with a muscular build, such as an athlete, may have a BMI that is higher than 24.9. In cases like these, BMI is not an accurate measure of body fat.  · To determine if excess body fat is the cause of a BMI of 25 or higher, further assessments may need to be done by a health care provider.  · BMI is usually interpreted in the same way for men and women.  Where to find more information  For more information about BMI, including tools to quickly calculate your BMI, go to these websites:  · Centers for Disease Control and Prevention: www.cdc.gov  · American Heart Association: www.heart.org  · National Heart, Lung, and Blood [place]: www.nhlbi.nih.gov  Summary  · Body mass index (BMI) is a number that is calculated from a person's weight and height.  · BMI may help estimate how much of a person's weight is composed of fat. BMI can help identify those who may be at higher risk for certain medical problems.  · BMI can be measured using English measurements or metric measurements.  · BMI charts are used to identify whether you are underweight, normal weight, overweight, or obese.  This information is not intended to replace advice given to you by your health care provider. Make sure you discuss any questions you have with your health care provider.  Document Revised: 09/09/2020 Document Reviewed: 07/17/2020  Pankaj " Patient Education © 2020 [employer].

## 2021-02-19 ENCOUNTER — TELEPHONE (OUTPATIENT)
Dept: GASTROENTEROLOGY | Facility: CLINIC | Age: 31
End: 2021-02-19

## 2021-02-19 NOTE — TELEPHONE ENCOUNTER
Spoke with patient regarding insurance denial of bowel prep. Informed her of new bowel prep and mailed new instructions to her for magnesium citrate.

## 2021-02-22 ENCOUNTER — HOSPITAL ENCOUNTER (OUTPATIENT)
Dept: NUCLEAR MEDICINE | Facility: HOSPITAL | Age: 31
Discharge: HOME | End: 2021-02-22

## 2021-02-22 PROCEDURE — 78226 HEPATOBILIARY SYSTEM IMAGING: CPT

## 2021-02-22 PROCEDURE — 0 TECHNETIUM TC 99M MEBROFENIN KIT: Performed by: NURSE PRACTITIONER

## 2021-02-22 PROCEDURE — A9537 TC99M MEBROFENIN: HCPCS | Performed by: NURSE PRACTITIONER

## 2021-02-22 RX ORDER — KIT FOR THE PREPARATION OF TECHNETIUM TC 99M MEBROFENIN 45 MG/10ML
1 INJECTION, POWDER, LYOPHILIZED, FOR SOLUTION INTRAVENOUS
Status: COMPLETED | OUTPATIENT
Start: 2021-02-22 | End: 2021-02-22

## 2021-02-22 RX ADMIN — MEBROFENIN 1 DOSE: 45 INJECTION, POWDER, LYOPHILIZED, FOR SOLUTION INTRAVENOUS at 12:05

## 2021-03-04 ENCOUNTER — IMMUNIZATION (OUTPATIENT)
Dept: VACCINE CLINIC | Facility: HOSPITAL | Age: 31
End: 2021-03-04

## 2021-03-04 PROCEDURE — 0001A: CPT | Performed by: THORACIC SURGERY (CARDIOTHORACIC VASCULAR SURGERY)

## 2021-03-04 PROCEDURE — 91300 HC SARSCOV02 VAC 30MCG/0.3ML IM: CPT | Performed by: THORACIC SURGERY (CARDIOTHORACIC VASCULAR SURGERY)

## 2021-03-12 ENCOUNTER — LAB (OUTPATIENT)
Dept: LAB | Facility: HOSPITAL | Age: 31
End: 2021-03-12

## 2021-03-12 DIAGNOSIS — Z01.818 PREOP TESTING: Primary | ICD-10-CM

## 2021-03-12 LAB — SARS-COV-2 ORF1AB RESP QL NAA+PROBE: NOT DETECTED

## 2021-03-12 PROCEDURE — C9803 HOPD COVID-19 SPEC COLLECT: HCPCS

## 2021-03-12 PROCEDURE — U0004 COV-19 TEST NON-CDC HGH THRU: HCPCS

## 2021-03-15 ENCOUNTER — ANESTHESIA EVENT (OUTPATIENT)
Dept: GASTROENTEROLOGY | Facility: HOSPITAL | Age: 31
End: 2021-03-15

## 2021-03-15 ENCOUNTER — HOSPITAL ENCOUNTER (OUTPATIENT)
Facility: HOSPITAL | Age: 31
Setting detail: HOSPITAL OUTPATIENT SURGERY
Discharge: HOME | End: 2021-03-15
Attending: INTERNAL MEDICINE | Admitting: INTERNAL MEDICINE

## 2021-03-15 ENCOUNTER — ANESTHESIA (OUTPATIENT)
Dept: GASTROENTEROLOGY | Facility: HOSPITAL | Age: 31
End: 2021-03-15

## 2021-03-15 VITALS
BODY MASS INDEX: 27.08 KG/M2 | SYSTOLIC BLOOD PRESSURE: 105 MMHG | DIASTOLIC BLOOD PRESSURE: 55 MMHG | RESPIRATION RATE: 20 BRPM | WEIGHT: 143.4 LBS | TEMPERATURE: 98 F | OXYGEN SATURATION: 98 % | HEART RATE: 87 BPM | HEIGHT: 61 IN

## 2021-03-15 DIAGNOSIS — R19.7 DIARRHEA, UNSPECIFIED TYPE: ICD-10-CM

## 2021-03-15 DIAGNOSIS — R10.84 GENERALIZED ABDOMINAL PAIN: ICD-10-CM

## 2021-03-15 DIAGNOSIS — K92.1 BLOOD IN STOOL: ICD-10-CM

## 2021-03-15 PROCEDURE — 43239 EGD BIOPSY SINGLE/MULTIPLE: CPT | Performed by: INTERNAL MEDICINE

## 2021-03-15 PROCEDURE — 25010000002 PROPOFOL 10 MG/ML EMULSION: Performed by: NURSE ANESTHETIST, CERTIFIED REGISTERED

## 2021-03-15 PROCEDURE — 45380 COLONOSCOPY AND BIOPSY: CPT | Performed by: INTERNAL MEDICINE

## 2021-03-15 RX ORDER — PROPOFOL 10 MG/ML
VIAL (ML) INTRAVENOUS AS NEEDED
Status: DISCONTINUED | OUTPATIENT
Start: 2021-03-15 | End: 2021-03-15 | Stop reason: SURG

## 2021-03-15 RX ORDER — LIDOCAINE HYDROCHLORIDE 20 MG/ML
INJECTION, SOLUTION INTRAVENOUS AS NEEDED
Status: DISCONTINUED | OUTPATIENT
Start: 2021-03-15 | End: 2021-03-15 | Stop reason: SURG

## 2021-03-15 RX ORDER — DEXTROSE AND SODIUM CHLORIDE 5; .45 G/100ML; G/100ML
30 INJECTION, SOLUTION INTRAVENOUS CONTINUOUS PRN
Status: DISCONTINUED | OUTPATIENT
Start: 2021-03-15 | End: 2021-03-15 | Stop reason: HOSPADM

## 2021-03-15 RX ADMIN — PROPOFOL 50 MG: 10 INJECTION, EMULSION INTRAVENOUS at 15:43

## 2021-03-15 RX ADMIN — DEXTROSE AND SODIUM CHLORIDE 30 ML/HR: 5; 450 INJECTION, SOLUTION INTRAVENOUS at 15:07

## 2021-03-15 RX ADMIN — PROPOFOL 70 MG: 10 INJECTION, EMULSION INTRAVENOUS at 15:33

## 2021-03-15 RX ADMIN — PROPOFOL 50 MG: 10 INJECTION, EMULSION INTRAVENOUS at 15:40

## 2021-03-15 RX ADMIN — PROPOFOL 20 MG: 10 INJECTION, EMULSION INTRAVENOUS at 15:44

## 2021-03-15 RX ADMIN — PROPOFOL 80 MG: 10 INJECTION, EMULSION INTRAVENOUS at 15:35

## 2021-03-15 RX ADMIN — PROPOFOL 30 MG: 10 INJECTION, EMULSION INTRAVENOUS at 15:45

## 2021-03-15 RX ADMIN — LIDOCAINE HYDROCHLORIDE 100 MG: 20 INJECTION, SOLUTION INTRAVENOUS at 15:33

## 2021-03-15 NOTE — NURSING NOTE
I spoke with Kat to confirm transportation to home & advised of pt d/c process & anticipated time frame

## 2021-03-15 NOTE — ANESTHESIA POSTPROCEDURE EVALUATION
Patient: Rosey Aguilar    Procedure Summary     Date: 03/15/21 Room / Location: [facility] ENDOSCOPY 1 / [facility] ENDOSCOPY    Anesthesia Start: 1532 Anesthesia Stop: 1547    Procedures:       ESOPHAGOGASTRODUODENOSCOPY (N/A )      COLONOSCOPY (N/A ) Diagnosis:       Generalized abdominal pain      Diarrhea, unspecified type      Blood in stool      (Generalized abdominal pain [R10.84])      (Diarrhea, unspecified type [R19.7])      (Blood in stool [K92.1])    Surgeons: Demarco Robles MD Provider: Adina Redman CRNA    Anesthesia Type: MAC ASA Status: 2          Anesthesia Type: MAC    Vitals  No vitals data found for the desired time range.          Post Anesthesia Care and Evaluation    Patient location during evaluation: bedside  Patient participation: complete - patient participated  Level of consciousness: sleepy but conscious  Pain score: 0  Pain management: adequate  Airway patency: patent  Anesthetic complications: No anesthetic complications  PONV Status: none  Cardiovascular status: hemodynamically stable  Respiratory status: spontaneous ventilation and room air  Hydration status: acceptable

## 2021-03-15 NOTE — ANESTHESIA PREPROCEDURE EVALUATION
Anesthesia Evaluation     NPO Solid Status: > 4 hours  NPO Liquid Status: > 4 hours           Airway   Mallampati: III  TM distance: >3 FB  Neck ROM: full  Possible difficult intubation  Dental      Pulmonary    Cardiovascular   Exercise tolerance: excellent (>7 METS)    NYHA Classification: II  Rhythm: regular  Rate: normal        Neuro/Psych  (+) psychiatric history Anxiety,     GI/Hepatic/Renal/Endo    (+)  GI bleeding lower ,     Musculoskeletal     Abdominal    Substance History      OB/GYN          Other                        Anesthesia Plan    ASA 2     MAC     intravenous induction     Anesthetic plan, all risks, benefits, and alternatives have been provided, discussed and informed consent has been obtained with: patient.    Plan discussed with CRNA.

## 2021-03-15 NOTE — NURSING NOTE
No discharge time put in computer by post nurse. Approximate time put in and pt discharged in computer per KelsieRN

## 2021-03-18 LAB
LAB AP CASE REPORT: NORMAL
PATH REPORT.FINAL DX SPEC: NORMAL

## 2021-03-22 ENCOUNTER — OFFICE VISIT (OUTPATIENT)
Dept: GASTROENTEROLOGY | Facility: CLINIC | Age: 31
End: 2021-03-22

## 2021-03-22 VITALS
WEIGHT: 147.8 LBS | DIASTOLIC BLOOD PRESSURE: 77 MMHG | SYSTOLIC BLOOD PRESSURE: 110 MMHG | HEIGHT: 61 IN | HEART RATE: 80 BPM | BODY MASS INDEX: 27.9 KG/M2

## 2021-03-22 DIAGNOSIS — K29.50 CHRONIC GASTRITIS WITHOUT BLEEDING, UNSPECIFIED GASTRITIS TYPE: ICD-10-CM

## 2021-03-22 DIAGNOSIS — K58.0 IRRITABLE BOWEL SYNDROME WITH DIARRHEA: Primary | ICD-10-CM

## 2021-03-22 PROBLEM — N92.0 MENORRHAGIA WITH REGULAR CYCLE: Status: ACTIVE | Noted: 2021-01-14

## 2021-03-22 PROBLEM — N94.6 DYSMENORRHEA: Status: ACTIVE | Noted: 2021-01-14

## 2021-03-22 PROCEDURE — 99213 OFFICE O/P EST LOW 20 MIN: CPT | Performed by: NURSE PRACTITIONER

## 2021-03-22 RX ORDER — DICYCLOMINE HYDROCHLORIDE 10 MG/1
10 CAPSULE ORAL
Qty: 120 CAPSULE | Refills: 11 | Status: SHIPPED | OUTPATIENT
Start: 2021-03-22 | End: 2022-05-03 | Stop reason: SDUPTHER

## 2021-03-22 NOTE — PROGRESS NOTES
Chief Complaint   Patient presents with   • Abdominal Pain   • Nausea   • Diarrhea       Subjective    Rosey Aguilar is a 31 y.o. female. she is here today for follow-up.    Abdominal Pain  This is a chronic problem. The current episode started more than 1 month ago. The onset quality is gradual. The problem occurs intermittently. The problem has been waxing and waning. The pain is located in the RUQ and epigastric region. The pain is mild. The quality of the pain is cramping, dull and a sensation of fullness. Associated symptoms include diarrhea and nausea. Pertinent negatives include no anorexia, arthralgias, belching, constipation, dysuria, fever, flatus, frequency, headaches, hematochezia or hematuria. The pain is aggravated by eating. Prior diagnostic workup includes lower endoscopy, upper endoscopy and ultrasound (HIDA ). Her past medical history is significant for irritable bowel syndrome.   Irritable Bowel Syndrome  This is a new problem. The current episode started more than 1 month ago. The problem occurs intermittently. The problem has been waxing and waning. Associated symptoms include abdominal pain and nausea. Pertinent negatives include no anorexia, arthralgias, fever or headaches. She has tried nothing for the symptoms.   EGD noted mildly severe esophagitis gastritis normal duodenum.  Antrum biopsy noted reactive gastropathy.  Esophageal biopsy no reactive squamous mucosa.  Small bowel biopsy no no significant histologic abnormality.  Colonoscopy had adequate prep hemorrhoids were seen otherwise appeared normal.  Random colonic biopsy no no significant histologic abnormality patient will need repeat colonoscopy at age 49 for surveillance.     The following portions of the patient's history were reviewed and updated as appropriate:   Past Medical History:   Diagnosis Date   • Anxiety    • Encounter for  visit    • Pneumonia, histoplasma (CMS/HCC)     on sporonox      • Solitary lung nodule      RIGHT lower lobe      • Supervision of normal first pregnancy      Past Surgical History:   Procedure Laterality Date   •  SECTION     • COLONOSCOPY N/A 3/15/2021    Procedure: COLONOSCOPY;  Surgeon: Demarco Robles MD;  Location: [facility] ENDOSCOPY;  Service: Gastroenterology;  Laterality: N/A;   • ENDOSCOPY N/A 3/15/2021    Procedure: ESOPHAGOGASTRODUODENOSCOPY;  Surgeon: Demarco Robles MD;  Location: [facility] ENDOSCOPY;  Service: Gastroenterology;  Laterality: N/A;   • LAPAROSCOPIC APPENDECTOMY  2012    Acute appendicitis   • PAP SMEAR  2010    negative   • WISDOM TOOTH EXTRACTION       Family History   Problem Relation Age of Onset   • No Known Problems Mother    • No Known Problems Father    • Colon cancer Maternal Aunt    • Breast cancer Paternal Aunt    • Breast cancer Other    • Colon cancer Other    • Diabetes Other    • Heart disease Other    • Hypertension Other    • Stroke Other    • Gallbladder disease Other         stones     OB History    No obstetric history on file.       Prior to Admission medications    Medication Sig Start Date End Date Taking? Authorizing Provider   buPROPion XL (WELLBUTRIN XL) 300 MG 24 hr tablet Take 300 mg by mouth Daily. 19  Yes ProviderTera MD   cetirizine (ZyrTEC Allergy) 10 MG tablet Take 10 mg by mouth Daily.   Yes ProviderTera MD   FLUoxetine (PROzac) 20 MG capsule Take 40 mg by mouth Daily. 10/16/20  Yes ProviderTera MD   naproxen (NAPROSYN) 500 MG tablet TAKE 1 TABLET BY MOUTH 2 (TWO) TIMES A DAY AS NEEDED FOR MODERATE PAIN . 20  Yes Fabrice Langley DPM     Allergies   Allergen Reactions   • Ceclor [Cefaclor] Rash     Social History     Socioeconomic History   • Marital status:      Spouse name: Not on file   • Number of children: Not on file   • Years of education: Not on file   • Highest education level: Not on file   Tobacco Use   • Smoking status: Never Smoker   • Smokeless tobacco: Never  "Used   • Tobacco comment: No exposure to environmental tobacco smoke   Vaping Use   • Vaping Use: Never used   Substance and Sexual Activity   • Alcohol use: No   • Drug use: Never   • Sexual activity: Defer       Review of Systems  Review of Systems   Constitutional: Negative for activity change, appetite change, fever and unexpected weight change.   HENT: Negative for trouble swallowing.    Respiratory: Negative for shortness of breath.    Gastrointestinal: Positive for abdominal pain, diarrhea and nausea. Negative for abdominal distention, anal bleeding, anorexia, blood in stool, constipation, flatus, hematochezia and rectal pain.   Genitourinary: Negative for dysuria, frequency and hematuria.   Musculoskeletal: Negative for arthralgias.   Skin: Negative for pallor.   Neurological: Negative for light-headedness and headaches.        /77 (BP Location: Left arm)   Pulse 80   Ht 154.9 cm (61\")   Wt 67 kg (147 lb 12.8 oz)   BMI 27.93 kg/m²     Objective    Physical Exam  Constitutional:       General: She is not in acute distress.     Appearance: Normal appearance. She is well-developed.   Neck:      Thyroid: No thyroid mass or thyromegaly.   Pulmonary:      Effort: Pulmonary effort is normal.   Abdominal:      General: Bowel sounds are normal. There is no distension.      Palpations: Abdomen is soft.      Tenderness: There is abdominal tenderness in the right upper quadrant.      Hernia: No hernia is present.       Admission on 03/15/2021, Discharged on 03/15/2021   Component Date Value Ref Range Status   • Case Report 03/15/2021    Final                    Value:Surgical Pathology Report                         Case: [id number]                                  Authorizing Provider:  Demarco Robles MD        Collected:           03/15/2021 03:40 PM          Ordering Location:     [facility]             Received:            03/16/2021 07:02 AM                                 [facility]   "                                                   Pathologist:           Aleks Martinez MD                                                           Specimens:   1) - Small Intestine, Duodenum, small bowel                                                         2) - Gastric, Antrum                                                                                3) - Esophagus, Distal                                                                              4) - Large Intestine, colonic mucosa                                                      • Final Diagnosis 03/15/2021    Final                    Value:This result contains rich text formatting which cannot be displayed here.     Assessment/Plan      1. Irritable bowel syndrome with diarrhea    2. Chronic gastritis without bleeding, unspecified gastritis type    .   Trial of Bentyl for irritable bowel syndrome diarrhea predominant continue with dietary modifications for chronic gastritis and reflux symptoms.  Recommend bland diet.  Follow-up here as needed    Orders placed during this encounter include:  No orders of the defined types were placed in this encounter.      * Surgery not found *    Review and/or summary of lab tests, radiology, procedures, medications. Review and summary of old records and obtaining of history. The risks and benefits of my recommendations, as well as other treatment options were discussed with the patient today. Questions were answered.    No orders of the defined types were placed in this encounter.      Follow-up: Return for Recheck.          This document has been electronically signed by FREDY Ford on March 22, 2021 13:30 CDT           I spent 18 minutes caring for Rosey on this date of service. This time includes time spent by me in the following activities:preparing for the visit, reviewing tests, obtaining and/or reviewing a separately obtained history, performing a medically appropriate examination and/or evaluation  , counseling and educating the patient/family/caregiver, ordering medications, tests, or procedures, referring and communicating with other health care professionals , documenting information in the medical record and care coordination    Results for orders placed or performed during the hospital encounter of 03/15/21   Tissue Pathology Exam    Specimen: A: Small Intestine, Duodenum; Tissue    B: Gastric, Antrum; Tissue    C: Esophagus, Distal; Tissue    D: Large Intestine; Tissue   Result Value Ref Range    Case Report       Surgical Pathology Report                         Case: [id number]                                  Authorizing Provider:  Demarco Robles MD        Collected:           03/15/2021 03:40 PM          Ordering Location:     [facility]             Received:            03/16/2021 07:02 AM                                 [facility]                                                     Pathologist:           Aleks Martinez MD                                                           Specimens:   1) - Small Intestine, Duodenum, small bowel                                                         2) - Gastric, Antrum                                                                                3) - Esophagus, Distal                                                                              4) - Large Intestine, colonic mucosa                                                       Final Diagnosis       SEE SCANNED REPORT       Results for orders placed or performed in visit on 03/12/21   COVID-19,[facility]TIMA PANTHER,[facility] IN-HOUSE, NP/OP SWAB IN UTM/VTM/SALINE TRANSPORT MEDIA,24 HR TAT - Swab, Nasopharynx    Specimen: Nasopharynx; Swab   Result Value Ref Range    COVID19 Not Detected Not Detected - Ref. Range   Results for orders placed or performed in visit on 02/10/21   CBC Auto Differential    Specimen: Blood   Result Value Ref Range    WBC 7.55 3.40 - 10.80 10*3/mm3    RBC 4.50 3.77 -  5.28 10*6/mm3    Hemoglobin 13.4 12.0 - 15.9 g/dL    Hematocrit 39.5 34.0 - 46.6 %    MCV 87.8 79.0 - 97.0 fL    MCH 29.8 26.6 - 33.0 pg    MCHC 33.9 31.5 - 35.7 g/dL    RDW 14.0 12.3 - 15.4 %    RDW-SD 45.0 37.0 - 54.0 fl    MPV 11.6 6.0 - 12.0 fL    Platelets 164 140 - 450 10*3/mm3    Neutrophil % 51.8 42.7 - 76.0 %    Lymphocyte % 40.9 19.6 - 45.3 %    Monocyte % 5.2 5.0 - 12.0 %    Eosinophil % 1.1 0.3 - 6.2 %    Basophil % 0.7 0.0 - 1.5 %    Immature Grans % 0.3 0.0 - 0.5 %    Neutrophils, Absolute 3.92 1.70 - 7.00 10*3/mm3    Lymphocytes, Absolute 3.09 0.70 - 3.10 10*3/mm3    Monocytes, Absolute 0.39 0.10 - 0.90 10*3/mm3    Eosinophils, Absolute 0.08 0.00 - 0.40 10*3/mm3    Basophils, Absolute 0.05 0.00 - 0.20 10*3/mm3    Immature Grans, Absolute 0.02 0.00 - 0.05 10*3/mm3    nRBC 0.0 0.0 - 0.2 /100 WBC   TSH    Specimen: Blood   Result Value Ref Range    TSH 0.893 0.270 - 4.200 uIU/mL   Lipase    Specimen: Blood   Result Value Ref Range    Lipase 45 13 - 60 U/L   Iron    Specimen: Blood   Result Value Ref Range    Iron 70 37 - 145 mcg/dL   Amylase    Specimen: Blood   Result Value Ref Range    Amylase 66 28 - 100 U/L   Comprehensive Metabolic Panel    Specimen: Blood   Result Value Ref Range    Glucose 92 65 - 99 mg/dL    BUN 14 6 - 20 mg/dL    Creatinine 0.76 0.57 - 1.00 mg/dL    Sodium 138 136 - 145 mmol/L    Potassium 4.1 3.5 - 5.2 mmol/L    Chloride 104 98 - 107 mmol/L    CO2 25.0 22.0 - 29.0 mmol/L    Calcium 8.9 8.6 - 10.5 mg/dL    Total Protein 7.3 6.0 - 8.5 g/dL    Albumin 4.10 3.50 - 5.20 g/dL    ALT (SGPT) 16 1 - 33 U/L    AST (SGOT) 13 1 - 32 U/L    Alkaline Phosphatase 72 39 - 117 U/L    Total Bilirubin 0.2 0.0 - 1.2 mg/dL    eGFR Non African Amer 89 >60 mL/min/1.73    Globulin 3.2 gm/dL    A/G Ratio 1.3 g/dL    BUN/Creatinine Ratio 18.4 7.0 - 25.0    Anion Gap 9.0 5.0 - 15.0 mmol/L   Results for orders placed or performed in visit on 08/05/20   CBC Auto Differential    Specimen: Blood   Result  Value Ref Range    WBC 8.21 3.40 - 10.80 10*3/mm3    RBC 4.72 3.77 - 5.28 10*6/mm3    Hemoglobin 14.1 12.0 - 15.9 g/dL    Hematocrit 41.0 34.0 - 46.6 %    MCV 86.9 79.0 - 97.0 fL    MCH 29.9 26.6 - 33.0 pg    MCHC 34.4 31.5 - 35.7 g/dL    RDW 13.0 12.3 - 15.4 %    RDW-SD 40.6 37.0 - 54.0 fl    MPV 12.0 6.0 - 12.0 fL    Platelets 141 140 - 450 10*3/mm3    Neutrophil % 59.6 42.7 - 76.0 %    Lymphocyte % 34.0 19.6 - 45.3 %    Monocyte % 5.1 5.0 - 12.0 %    Eosinophil % 0.4 0.3 - 6.2 %    Basophil % 0.5 0.0 - 1.5 %    Immature Grans % 0.4 0.0 - 0.5 %    Neutrophils, Absolute 4.90 1.70 - 7.00 10*3/mm3    Lymphocytes, Absolute 2.79 0.70 - 3.10 10*3/mm3    Monocytes, Absolute 0.42 0.10 - 0.90 10*3/mm3    Eosinophils, Absolute 0.03 0.00 - 0.40 10*3/mm3    Basophils, Absolute 0.04 0.00 - 0.20 10*3/mm3    Immature Grans, Absolute 0.03 0.00 - 0.05 10*3/mm3    nRBC 0.0 0.0 - 0.2 /100 WBC   Vitamin D 25 Hydroxy    Specimen: Blood   Result Value Ref Range    25 Hydroxy, Vitamin D 30.6 30.0 - 100.0 ng/ml   T3    Specimen: Blood   Result Value Ref Range    T3, Total 120.0 80.0 - 200.0 ng/dl   TSH    Specimen: Blood   Result Value Ref Range    TSH 0.722 0.270 - 4.200 uIU/mL   T4, Free    Specimen: Blood   Result Value Ref Range    Free T4 1.08 0.93 - 1.70 ng/dL   Magnesium    Specimen: Blood   Result Value Ref Range    Magnesium 2.2 1.6 - 2.6 mg/dL   Iron    Specimen: Blood   Result Value Ref Range    Iron 88 37 - 145 mcg/dL   Vitamin B12    Specimen: Blood   Result Value Ref Range    Vitamin B-12 671 211 - 946 pg/mL   Lipid Panel    Specimen: Blood   Result Value Ref Range    Total Cholesterol 181 0 - 200 mg/dL    Triglycerides 62 0 - 150 mg/dL    HDL Cholesterol 47 40 - 60 mg/dL    LDL Cholesterol  122 (H) 0 - 100 mg/dL    VLDL Cholesterol 12.4 5 - 40 mg/dL    LDL/HDL Ratio 2.59    Comprehensive Metabolic Panel    Specimen: Blood   Result Value Ref Range    Glucose 86 65 - 99 mg/dL    BUN 10 6 - 20 mg/dL    Creatinine 0.83 0.57 -  1.00 mg/dL    Sodium 136 136 - 145 mmol/L    Potassium 4.4 3.5 - 5.2 mmol/L    Chloride 105 98 - 107 mmol/L    CO2 22.9 22.0 - 29.0 mmol/L    Calcium 9.3 8.6 - 10.5 mg/dL    Total Protein 7.2 6.0 - 8.5 g/dL    Albumin 4.30 3.50 - 5.20 g/dL    ALT (SGPT) 17 1 - 33 U/L    AST (SGOT) 10 1 - 32 U/L    Alkaline Phosphatase 60 39 - 117 U/L    Total Bilirubin 0.4 0.0 - 1.2 mg/dL    eGFR Non African Amer 80 >60 mL/min/1.73    Globulin 2.9 gm/dL    A/G Ratio 1.5 g/dL    BUN/Creatinine Ratio 12.0 7.0 - 25.0    Anion Gap 8.1 5.0 - 15.0 mmol/L     *Note: Due to a large number of results and/or encounters for the requested time period, some results have not been displayed. A complete set of results can be found in Results Review.

## 2021-03-22 NOTE — PATIENT INSTRUCTIONS
"BMI for Adults  What is BMI?  Body mass index (BMI) is a number that is calculated from a person's weight and height. BMI can help estimate how much of a person's weight is composed of fat. BMI does not measure body fat directly. Rather, it is an alternative to procedures that directly measure body fat, which can be difficult and expensive.  BMI can help identify people who may be at higher risk for certain medical problems.  What are BMI measurements used for?  BMI is used as a screening tool to identify possible weight problems. It helps determine whether a person is obese, overweight, a healthy weight, or underweight.  BMI is useful for:  · Identifying a weight problem that may be related to a medical condition or may increase the risk for medical problems.  · Promoting changes, such as changes in diet and exercise, to help reach a healthy weight. BMI screening can be repeated to see if these changes are working.  How is BMI calculated?  BMI involves measuring your weight in relation to your height. Both height and weight are measured, and the BMI is calculated from those numbers. This can be done either in English (U.S[place] or metric measurements. Note that charts and online BMI calculators are available to help you find your BMI quickly and easily without having to do these calculations yourself.  To calculate your BMI in English (U.S.) measurements:    1. Measure your weight in pounds (lb).  2. Multiply the number of pounds by 703.  ? For example, for a person who weighs 180 lb, multiply that number by 703, which equals 126,540.  3. Measure your height in inches. Then multiply that number by itself to get a measurement called \"inches squared.\"  ? For example, for a person who is 70 inches tall, the \"inches squared\" measurement is 70 inches x 70 inches, which equals 4,900 inches squared.  4. Divide the total from step 2 (number of lb x 703) by the total from step 3 (inches squared): 126,540 ÷ 4,900 = 25.8. This is " "your BMI.  To calculate your BMI in metric measurements:  1. Measure your weight in kilograms (kg).  2. Measure your height in meters (m). Then multiply that number by itself to get a measurement called \"meters squared.\"  ? For example, for a person who is 1.75 m tall, the \"meters squared\" measurement is 1.75 m x 1.75 m, which is equal to 3.1 meters squared.  3. Divide the number of kilograms (your weight) by the meters squared number. In this example: 70 ÷ 3.1 = 22.6. This is your BMI.  What do the results mean?  BMI charts are used to identify whether you are underweight, normal weight, overweight, or obese. The following guidelines will be used:  · Underweight: BMI less than 18.5.  · Normal weight: BMI between 18.5 and 24.9.  · Overweight: BMI between 25 and 29.9.  · Obese: BMI of 30 or above.  Keep these notes in mind:  · Weight includes both fat and muscle, so someone with a muscular build, such as an athlete, may have a BMI that is higher than 24.9. In cases like these, BMI is not an accurate measure of body fat.  · To determine if excess body fat is the cause of a BMI of 25 or higher, further assessments may need to be done by a health care provider.  · BMI is usually interpreted in the same way for men and women.  Where to find more information  For more information about BMI, including tools to quickly calculate your BMI, go to these websites:  · Centers for Disease Control and Prevention: www.cdc.gov  · American Heart Association: www.heart.org  · National Heart, Lung, and Blood [place]: www.nhlbi.nih.gov  Summary  · Body mass index (BMI) is a number that is calculated from a person's weight and height.  · BMI may help estimate how much of a person's weight is composed of fat. BMI can help identify those who may be at higher risk for certain medical problems.  · BMI can be measured using English measurements or metric measurements.  · BMI charts are used to identify whether you are underweight, normal " weight, overweight, or obese.  This information is not intended to replace advice given to you by your health care provider. Make sure you discuss any questions you have with your health care provider.  Document Revised: 09/09/2020 Document Reviewed: 07/17/2020  Elsevier Patient Education © 2021 [employer]

## 2021-03-25 ENCOUNTER — IMMUNIZATION (OUTPATIENT)
Dept: VACCINE CLINIC | Facility: HOSPITAL | Age: 31
End: 2021-03-25

## 2021-03-25 PROCEDURE — 91300 HC SARSCOV02 VAC 30MCG/0.3ML IM: CPT | Performed by: THORACIC SURGERY (CARDIOTHORACIC VASCULAR SURGERY)

## 2021-03-25 PROCEDURE — 0002A: CPT | Performed by: THORACIC SURGERY (CARDIOTHORACIC VASCULAR SURGERY)

## 2022-05-03 RX ORDER — DICYCLOMINE HYDROCHLORIDE 10 MG/1
10 CAPSULE ORAL
Qty: 120 CAPSULE | Refills: 11 | Status: SHIPPED | OUTPATIENT
Start: 2022-05-03

## 2022-05-03 RX ORDER — DICYCLOMINE HYDROCHLORIDE 10 MG/1
10 CAPSULE ORAL
Qty: 360 CAPSULE | Refills: 3 | OUTPATIENT
Start: 2022-05-03

## 2022-06-07 ENCOUNTER — OFFICE VISIT (OUTPATIENT)
Dept: BEHAVIORAL HEALTH | Facility: CLINIC | Age: 32
End: 2022-06-07

## 2022-06-07 DIAGNOSIS — F41.1 GENERALIZED ANXIETY DISORDER: Primary | ICD-10-CM

## 2022-06-07 PROCEDURE — 90791 PSYCH DIAGNOSTIC EVALUATION: CPT | Performed by: PSYCHOLOGIST

## 2022-06-20 NOTE — PROGRESS NOTES
6/20/2022    Rosey Aguilar, a 32 y.o. female, was seen today for initial appointment lasting 45 minutes.  10-10:45 am CST  Patient is referred by FREDY Brito ([place], [place]) for an assessment related to ADHD.     SUBJECTIVE:  She is experiencing: inattention, hyperactivity, academic underachievement, restlessness, fidgety, impulsivity, talkativeness, racing thoughts, mood swings, easily distracted, poor organizational skills, poor coping skills, interrupts others, quick temper, irritability, forgetfulness, low tolerance to stress, worry, nervousness, easily upset, panic attacks, low tolerance to stress, poor coping skills, social isolation, and irritability.    She was diagnosed MDD and JOANN.    The symptoms of ADHD began in childhood.     FAMILY HISTORY:  ADHD- nephew, daughter  MDD- client, mother  Anxiety- client, sister, mother  Alcohol- none  Drug- none    She met all developmental milestones on time.    Her parents  in 1979.  The relationship produced 2 daughters ('84 and '89).  Her mother worked in an office for [employer].  Her father worked for [employer].      She  in 2011.  The relationship produced 2 children ('14 son, and '16 daughter).  Her spouse is a teacher at [employer].  Her son had a traumatic brain injury at birth (2014).  He also cerebral palsy.       She graduated from [employer] in 2007.  She obtained a BS in Accounting from [employer] in 2011.  She has been employed at [employer] since 2018.    MENTAL STATUS:  The patient was appropriately dressed and groomed.  The patient’s speech was WNL (rate, volume, articulation, coherence, etc.).  The patient was oriented to time, place, and person.  The patient’s memory (remote and recent) was intact.  The patient’s attention and concentration were WNL.  The patient’s mood and affect were congruent.    The patient’s thought content did not appear to possess delusions or hallucinations. These results do not appear  to be significantly influenced by the effects of visual, auditory, or motor deficits, environmental/economic or cultural differences.  The patient denied SI/HI.        strengths: the patient is polite and courteous  weakness: the patient is unable to manage symptoms   short term goal: the patient will reduce symptoms from 8 x day to 1 x week  long term goal: the patient will eliminate the above-mentioned symptoms    DIAGNOSIS:    ICD-10-CM ICD-9-CM   1. Generalized anxiety disorder  F41.1 300.02       ASSESSMENT PLAN:  She will complete the assessment.        This document has been electronically signed by Desean Allen, PhD on June 20, 2022 15:26 CDT

## 2022-08-19 ENCOUNTER — OFFICE VISIT (OUTPATIENT)
Dept: BEHAVIORAL HEALTH | Facility: CLINIC | Age: 32
End: 2022-08-19

## 2022-08-19 DIAGNOSIS — F90.2 ATTENTION DEFICIT HYPERACTIVITY DISORDER, COMBINED TYPE: ICD-10-CM

## 2022-08-19 PROCEDURE — 96130 PSYCL TST EVAL PHYS/QHP 1ST: CPT | Performed by: PSYCHOLOGIST

## 2022-08-26 NOTE — PROGRESS NOTES
[facility]  [address] [place] [id number]  PHONE : [phone]  FAX: [phone]      DATE:  08/19/2022    PATIENT:   Rosey Aguilar 1990                                 MEDICAL RECORD #:  [id number]  Chronological age: 32 y.o.   Date of Psychological Assessment:   Examiner: Desean Allen, PhD   Licensed Psychologist        Tests Administered:  Phylicia Brief Intelligence Test- Second Edition (KBIT-2)  Wide Range Achievement Test- Fifth Edition (WRAT-5)  Brown ADD Scales (Brown ADD)  Gary Depression Inventory (BDI-2)  Gary Anxiety inventory (EDWIN)  Kelvin’s Incomplete Sentence Blank- Adult (RISB-A)  Clinical Interview and Review of Records    Identification and Referral Information:   Ms. Aguilar was referred by FREDY Brito ([place], [place]) for an assessment related to ADHD.     Presenting Problem and Background Information:  Ms. Aguilar is experiencing: inattention, hyperactivity, academic underachievement, restlessness, fidgety, impulsivity, talkativeness, racing thoughts, mood swings, easily distracted, poor organizational skills, poor coping skills, interrupts others, quick temper, irritability, forgetfulness, low tolerance to stress, worry, nervousness, easily upset, panic attacks, low tolerance to stress, poor coping skills, social isolation, and irritability.     She was diagnosed MDD and JOANN.     The symptoms of ADHD began in childhood.    Behavioral Observations:  Rosey was alert and oriented to time, place, and person. Her thought content did not appear to possess delusions or hallucinations. These results do not appear to be significantly influenced by the effects of visual, auditory, or motor deficits, environmental/economic or cultural differences. The following results are thought to be valid.      Test Results:  The interpretive information in this report should be viewed as only one source of hypotheses and no  decision should be based solely on this information. This data should be integrated with all other sources of information in reaching professional decisions about the individual. This report is confidential and intended for use by qualified professionals only.    KBIT-2  The KBIT-2 is a brief, individually administered measure of verbal and nonverbal intelligence for children, adolescents, and adults, spanning the ages from 4 to 90 years.    Ms. Aguilar obtained an IQ Composite Standard Score of 96 which yielded a Percentile of 39 and places her in the Average range of intellectual functioning. A Percentile of 39 means that she scored as well as or better than 39 out of 100 peers in the sample population. At a 90% Confidence Interval her true IQ Score falls between 89 and 103.  Individuals with similar scores learn material at a similar rate compared to same age peers and require a similar degree of examples, repetition and guided practice as same-aged peers.    The 3 point difference between the Verbal Standard Score (95, Average, 37%ile 18.6 years age equivalent) and the Nonverbal Standard Score (98, Average, 45%ile, 15.4 years age equivalent) was not significant and suggests that her verbal reasoning abilities are equally developed compared to her nonverbal reasoning abilities.    WRAT-5   The WRAT-5 is a screening measure of academic achievement. Rosey graduated from [employer] in 2007.  She obtained a BS in Accounting from [email or web address] in 2011.  She has been employed at [employer] since 2018.      The results of the WRAT-5 indicate she is performing at a Grade Score of >12.9 on the Word Reading Subtest with a standard score of 106 and a percentile rank of 66.  On the Spelling Subtest, the examinee obtained a Standard Score of 115 (84%ile) and a Grade Score of >12.9. On the Math Computation Subtest, the examinee obtained a Standard Score of 102 (55%ile) and a Grade Score of >12.9.  On the Sentence Comprehension,  the examinee obtained a Standard Score of 106 (66%ile) and a Grade Score of >12.9. On the Reading Composite the examinee obtained a Standard Score of 106 (66%ile).        The scores on the WRAT-5 are commensurate with her cognitive ability.      Brown ADD Scales  The Brown ADD Scales help to assess a wide range of symptoms of executive function impairments associated with ADHD/ADD.  The Brown ADD Scales include 40 items that assess five clusters of ADHD-related executive function impairments.      Ms. Aguilar, her coworker, and her friend completed the rating scales.  T-Scores 60 and above are considered clinically significant.  She obtained the following T-scores:      Activation Attention Effort Affect Memory Total Score   Self 63 69 53 58 65 60   Coworker  75 88 66 80 96 86   Friend   60 58 53 62 65 63     The results of the Brown ADD Scales indicate symptoms of ADHD.  However, no data exists to establish the onset of symptoms.     BDI-2  The BDI-2 is a 21 item, self-report instrument for measuring the severity of depression in adults and adolescents aged 13 years or older. The BDI-2 was developed for the assessment of symptoms corresponding to criteria for diagnosing depressive disorders listed in the American Psychiatric Association's Diagnostic and Statistical Manual of Mental Disorders (DSM-IV-TR). Scores 29 and above are considered “severe”, 20-28 are “moderate”, 14-19 are “mild”, and 0-13 are “minimal”.        Ms. Aguilar obtained a score of 0 on the BDI-2. This score falls in the “minimal” range of depressive symptoms.  She is not endorsing clinically significant symptoms of depression.    EDWIN  The Gary Anxiety Inventory (EDWIN) is a widely used 21-item self-report inventory used to assess anxiety levels in adults and adolescents. It has been used in multiple studies, including in treatment-outcome studies for individuals who have experienced traumas. The age range for the measure is from 17 to 80 years.     "    The EDWIN discriminates between anxious and non-anxious groups.  The inventory contains 21 items rated from 0 to 3 by the taker, with a total possible score of 63 points. The items are experiences related to anxiety such as \"Fear of worst happening\" or \"Heart pounding/racing\".  Total scores 0-7 = minimal, 8-15 = mild, 16-25 = moderate, 26+ = severe.  Scores of 26 and above indicate statistically significant levels of anxiety.    Ms. Aguilar obtained a score of 7 on the EDWIN.  This score falls in the “minimal” level of anxiety symptoms.  She is not endorsing clinically significant symptoms of anxiety.    RISB-A  Kelvin’s Incomplete Sentence Blank- Adult is a 40 item fill-in-the blank project test designed to gather psychological data in the assessment process.  The results of the RISB-A indicate fatigue, nervousness, and low tolerance to stress.       Diagnosis  Problems Addressed this Visit    None     Visit Diagnoses     Attention deficit hyperactivity disorder, combined type          Diagnoses       Codes Comments    Attention deficit hyperactivity disorder, combined type     ICD-10-CM: F90.2  ICD-9-CM: 314.01         Summary:   Ms. Aguilar was referred by FREDY Brito ([place], [place]) for an assessment related to ADHD.    The results of the K-BIT-2 indicate that she is performing in the Average range (96 IQ Composite).  The results of the WRAT-5 indicate the following grade scores: >12.9 in Word Reading, >12.9 in Spelling, >12.9 in Math Computation, and >12.9 in Sentence Comprehension. The results of the Brown ADD indicate ADHD. The results of the BDI-2 indicate “minimal” anxiety. The results of the EDWIN indicate “minimal” anxiety. The results of the RISB-A indicate fatigue, nervousness, and low tolerance to stress.      Recommendations:  It is the recommendation of the undersigned that Rosey Aguilar receive:   1. Ongoing counseling as necessary to address ADHD.   2. psychiatric treatment as " needed  3. educational and vocational assistance as necessary     I spent 60 minutes in direct face to face contact with patient.  Greater than 50% of this time was spent counseling patient and discussing plan of care.            This document has been electronically signed by Desean Allen, PhD on August 26, 2022 16:26 CDT        Desean Allen, PhD   Licensed Psychologist

## 2023-01-13 ENCOUNTER — LAB (OUTPATIENT)
Dept: LAB | Facility: HOSPITAL | Age: 33
End: 2023-01-13
Payer: COMMERCIAL

## 2023-01-13 ENCOUNTER — TRANSCRIBE ORDERS (OUTPATIENT)
Dept: LAB | Facility: HOSPITAL | Age: 33
End: 2023-01-13
Payer: COMMERCIAL

## 2023-01-13 DIAGNOSIS — R33.9 URINARY RETENTION: ICD-10-CM

## 2023-01-13 DIAGNOSIS — Z11.3 SCREENING EXAMINATION FOR VENEREAL DISEASE: Primary | ICD-10-CM

## 2023-01-13 LAB
BACTERIA UR QL AUTO: NORMAL /HPF
BILIRUB UR QL STRIP: NEGATIVE
CLARITY UR: CLEAR
COLOR UR: YELLOW
GLUCOSE UR STRIP-MCNC: NEGATIVE MG/DL
HGB UR QL STRIP.AUTO: NEGATIVE
HYALINE CASTS UR QL AUTO: NORMAL /LPF
KETONES UR QL STRIP: NEGATIVE
LEUKOCYTE ESTERASE UR QL STRIP.AUTO: NEGATIVE
NITRITE UR QL STRIP: NEGATIVE
PH UR STRIP.AUTO: 7 [PH] (ref 5–9)
PROT UR QL STRIP: NEGATIVE
RBC # UR STRIP: NORMAL /HPF
REF LAB TEST METHOD: NORMAL
SP GR UR STRIP: 1.01 (ref 1–1.03)
SQUAMOUS #/AREA URNS HPF: NORMAL /HPF
UROBILINOGEN UR QL STRIP: NORMAL
WBC # UR STRIP: NORMAL /HPF

## 2023-01-13 PROCEDURE — 87086 URINE CULTURE/COLONY COUNT: CPT | Performed by: OBSTETRICS & GYNECOLOGY

## 2023-01-13 PROCEDURE — 81001 URINALYSIS AUTO W/SCOPE: CPT

## 2023-01-14 LAB — BACTERIA SPEC AEROBE CULT: NO GROWTH

## 2023-01-18 ENCOUNTER — LAB (OUTPATIENT)
Dept: LAB | Facility: HOSPITAL | Age: 33
End: 2023-01-18
Payer: COMMERCIAL

## 2023-01-18 DIAGNOSIS — Z11.3 SCREENING EXAMINATION FOR VENEREAL DISEASE: ICD-10-CM

## 2023-01-18 PROCEDURE — 87591 N.GONORRHOEAE DNA AMP PROB: CPT

## 2023-01-18 PROCEDURE — 87491 CHLMYD TRACH DNA AMP PROBE: CPT

## 2023-01-18 PROCEDURE — 87661 TRICHOMONAS VAGINALIS AMPLIF: CPT

## 2023-01-19 LAB
C TRACH RRNA CVX QL NAA+PROBE: NEGATIVE
N GONORRHOEA RRNA SPEC QL NAA+PROBE: NEGATIVE
TRICHOMONAS VAGINALIS PCR: NEGATIVE

## (undated) DEVICE — CANN SMPL SOFTECH BIFLO ETCO2 A/M 7FT

## (undated) DEVICE — BITEBLOCK ENDO W/STRAP 60F A/ LF DISP

## (undated) DEVICE — SINGLE-USE BIOPSY FORCEPS: Brand: RADIAL JAW 4